# Patient Record
Sex: MALE | Race: BLACK OR AFRICAN AMERICAN | NOT HISPANIC OR LATINO | URBAN - METROPOLITAN AREA
[De-identification: names, ages, dates, MRNs, and addresses within clinical notes are randomized per-mention and may not be internally consistent; named-entity substitution may affect disease eponyms.]

---

## 2018-09-17 ENCOUNTER — EMERGENCY (EMERGENCY)
Facility: HOSPITAL | Age: 53
LOS: 1 days | Discharge: HOME | End: 2018-09-17
Attending: EMERGENCY MEDICINE
Payer: MEDICAID

## 2018-09-17 VITALS
HEART RATE: 55 BPM | WEIGHT: 210.1 LBS | SYSTOLIC BLOOD PRESSURE: 130 MMHG | OXYGEN SATURATION: 97 % | DIASTOLIC BLOOD PRESSURE: 78 MMHG | RESPIRATION RATE: 17 BRPM | TEMPERATURE: 98 F | HEIGHT: 69 IN

## 2018-09-17 LAB
APPEARANCE UR: CLEAR — SIGNIFICANT CHANGE UP
BACTERIA # UR AUTO: NEGATIVE — SIGNIFICANT CHANGE UP
BILIRUB UR-MCNC: NEGATIVE — SIGNIFICANT CHANGE UP
COLOR SPEC: SIGNIFICANT CHANGE UP
DIFF PNL FLD: NEGATIVE — SIGNIFICANT CHANGE UP
EPI CELLS # UR: 0 /HPF — SIGNIFICANT CHANGE UP
GLUCOSE UR QL: NEGATIVE — SIGNIFICANT CHANGE UP
HYALINE CASTS # UR AUTO: 0 /LPF — SIGNIFICANT CHANGE UP (ref 0–2)
KETONES UR-MCNC: NEGATIVE — SIGNIFICANT CHANGE UP
LEUKOCYTE ESTERASE UR-ACNC: NEGATIVE — SIGNIFICANT CHANGE UP
NITRITE UR-MCNC: NEGATIVE — SIGNIFICANT CHANGE UP
PH UR: 6.5 — SIGNIFICANT CHANGE UP (ref 5–8)
PROT UR-MCNC: NEGATIVE — SIGNIFICANT CHANGE UP
RBC CASTS # UR COMP ASSIST: 1 /HPF — SIGNIFICANT CHANGE UP (ref 0–4)
SP GR SPEC: 1.02 — SIGNIFICANT CHANGE UP
UROBILINOGEN FLD QL: NEGATIVE — SIGNIFICANT CHANGE UP
WBC UR QL: 2 /HPF — SIGNIFICANT CHANGE UP (ref 0–5)

## 2018-09-17 PROCEDURE — 99283 EMERGENCY DEPT VISIT LOW MDM: CPT

## 2018-09-17 PROCEDURE — 81001 URINALYSIS AUTO W/SCOPE: CPT

## 2018-09-17 RX ORDER — IBUPROFEN 200 MG
600 TABLET ORAL ONCE
Qty: 0 | Refills: 0 | Status: COMPLETED | OUTPATIENT
Start: 2018-09-17 | End: 2018-09-17

## 2018-09-17 RX ORDER — ACETAMINOPHEN 500 MG
975 TABLET ORAL ONCE
Qty: 0 | Refills: 0 | Status: COMPLETED | OUTPATIENT
Start: 2018-09-17 | End: 2018-09-17

## 2018-09-17 RX ADMIN — Medication 975 MILLIGRAM(S): at 17:33

## 2018-09-17 RX ADMIN — Medication 975 MILLIGRAM(S): at 18:31

## 2018-09-17 RX ADMIN — Medication 600 MILLIGRAM(S): at 17:34

## 2018-09-17 RX ADMIN — Medication 600 MILLIGRAM(S): at 18:31
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
## 2018-09-17 NOTE — ED PROVIDER NOTE - MEDICAL DECISION MAKING DETAILS
Jordin: Patient with left lower back pain x 1 week, sharp, worse with movement. will give pain control, check ua, reassess. no bowel/bladder incontinence, no fever, no ivda, non radiating, ambulating and FROM of low back.
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
## 2018-09-17 NOTE — ED ADULT NURSE NOTE - NSIMPLEMENTINTERV_GEN_ALL_ED
Implemented All Universal Safety Interventions:  [place] to call system. Call bell, personal items and telephone within reach. Instruct patient to call for assistance. Room bathroom lighting operational. Non-slip footwear when patient is off stretcher. Physically safe environment: no spills, clutter or unnecessary equipment. Stretcher in lowest position, wheels locked, appropriate side rails in place.

## 2020-05-14 ENCOUNTER — TRANSCRIPTION ENCOUNTER (OUTPATIENT)
Age: 55
End: 2020-05-14

## 2020-05-14 ENCOUNTER — INPATIENT (INPATIENT)
Facility: HOSPITAL | Age: 55
LOS: 1 days | Discharge: HOME | DRG: 131 | End: 2020-05-16
Attending: SURGERY | Admitting: SURGERY
Payer: COMMERCIAL

## 2020-05-14 VITALS
OXYGEN SATURATION: 96 % | WEIGHT: 210.1 LBS | RESPIRATION RATE: 18 BRPM | HEART RATE: 79 BPM | TEMPERATURE: 99 F | HEIGHT: 68 IN | SYSTOLIC BLOOD PRESSURE: 163 MMHG | DIASTOLIC BLOOD PRESSURE: 99 MMHG

## 2020-05-14 DIAGNOSIS — S02.609B FRACTURE OF MANDIBLE, UNSPECIFIED, INITIAL ENCOUNTER FOR OPEN FRACTURE: ICD-10-CM

## 2020-05-14 LAB
ALBUMIN SERPL ELPH-MCNC: 4.4 G/DL — SIGNIFICANT CHANGE UP (ref 3.3–5)
ALP SERPL-CCNC: 110 U/L — SIGNIFICANT CHANGE UP (ref 40–120)
ALT FLD-CCNC: 21 U/L — SIGNIFICANT CHANGE UP (ref 10–45)
ANION GAP SERPL CALC-SCNC: 17 MMOL/L — SIGNIFICANT CHANGE UP (ref 5–17)
AST SERPL-CCNC: 20 U/L — SIGNIFICANT CHANGE UP (ref 10–40)
BASOPHILS # BLD AUTO: 0.02 K/UL — SIGNIFICANT CHANGE UP (ref 0–0.2)
BASOPHILS NFR BLD AUTO: 0.2 % — SIGNIFICANT CHANGE UP (ref 0–2)
BILIRUB SERPL-MCNC: 1.9 MG/DL — HIGH (ref 0.2–1.2)
BLD GP AB SCN SERPL QL: NEGATIVE — SIGNIFICANT CHANGE UP
BUN SERPL-MCNC: 11 MG/DL — SIGNIFICANT CHANGE UP (ref 7–23)
CALCIUM SERPL-MCNC: 9.7 MG/DL — SIGNIFICANT CHANGE UP (ref 8.4–10.5)
CHLORIDE SERPL-SCNC: 95 MMOL/L — LOW (ref 96–108)
CO2 SERPL-SCNC: 22 MMOL/L — SIGNIFICANT CHANGE UP (ref 22–31)
CREAT SERPL-MCNC: 1.14 MG/DL — SIGNIFICANT CHANGE UP (ref 0.5–1.3)
EOSINOPHIL # BLD AUTO: 0.01 K/UL — SIGNIFICANT CHANGE UP (ref 0–0.5)
EOSINOPHIL NFR BLD AUTO: 0.1 % — SIGNIFICANT CHANGE UP (ref 0–6)
GLUCOSE SERPL-MCNC: 123 MG/DL — HIGH (ref 70–99)
HCT VFR BLD CALC: 51.8 % — HIGH (ref 39–50)
HGB BLD-MCNC: 17.5 G/DL — HIGH (ref 13–17)
IMM GRANULOCYTES NFR BLD AUTO: 0.3 % — SIGNIFICANT CHANGE UP (ref 0–1.5)
LYMPHOCYTES # BLD AUTO: 1.03 K/UL — SIGNIFICANT CHANGE UP (ref 1–3.3)
LYMPHOCYTES # BLD AUTO: 9.4 % — LOW (ref 13–44)
MCHC RBC-ENTMCNC: 30.5 PG — SIGNIFICANT CHANGE UP (ref 27–34)
MCHC RBC-ENTMCNC: 33.8 GM/DL — SIGNIFICANT CHANGE UP (ref 32–36)
MCV RBC AUTO: 90.2 FL — SIGNIFICANT CHANGE UP (ref 80–100)
MONOCYTES # BLD AUTO: 0.82 K/UL — SIGNIFICANT CHANGE UP (ref 0–0.9)
MONOCYTES NFR BLD AUTO: 7.5 % — SIGNIFICANT CHANGE UP (ref 2–14)
NEUTROPHILS # BLD AUTO: 9.07 K/UL — HIGH (ref 1.8–7.4)
NEUTROPHILS NFR BLD AUTO: 82.5 % — HIGH (ref 43–77)
NRBC # BLD: 0 /100 WBCS — SIGNIFICANT CHANGE UP (ref 0–0)
PLATELET # BLD AUTO: 171 K/UL — SIGNIFICANT CHANGE UP (ref 150–400)
POTASSIUM SERPL-MCNC: 3.8 MMOL/L — SIGNIFICANT CHANGE UP (ref 3.5–5.3)
POTASSIUM SERPL-SCNC: 3.8 MMOL/L — SIGNIFICANT CHANGE UP (ref 3.5–5.3)
PROT SERPL-MCNC: 8.4 G/DL — HIGH (ref 6–8.3)
RBC # BLD: 5.74 M/UL — SIGNIFICANT CHANGE UP (ref 4.2–5.8)
RBC # FLD: 12.6 % — SIGNIFICANT CHANGE UP (ref 10.3–14.5)
RH IG SCN BLD-IMP: POSITIVE — SIGNIFICANT CHANGE UP
RH IG SCN BLD-IMP: POSITIVE — SIGNIFICANT CHANGE UP
SARS-COV-2 RNA SPEC QL NAA+PROBE: SIGNIFICANT CHANGE UP
SODIUM SERPL-SCNC: 134 MMOL/L — LOW (ref 135–145)
WBC # BLD: 10.98 K/UL — HIGH (ref 3.8–10.5)
WBC # FLD AUTO: 10.98 K/UL — HIGH (ref 3.8–10.5)

## 2020-05-14 PROCEDURE — 76377 3D RENDER W/INTRP POSTPROCES: CPT | Mod: 26

## 2020-05-14 PROCEDURE — 99285 EMERGENCY DEPT VISIT HI MDM: CPT

## 2020-05-14 PROCEDURE — 70450 CT HEAD/BRAIN W/O DYE: CPT | Mod: 26

## 2020-05-14 PROCEDURE — 70486 CT MAXILLOFACIAL W/O DYE: CPT | Mod: 26

## 2020-05-14 RX ORDER — ACETAMINOPHEN 500 MG
975 TABLET ORAL ONCE
Refills: 0 | Status: COMPLETED | OUTPATIENT
Start: 2020-05-14 | End: 2020-05-14

## 2020-05-14 RX ORDER — OXYCODONE HYDROCHLORIDE 5 MG/1
5 TABLET ORAL ONCE
Refills: 0 | Status: DISCONTINUED | OUTPATIENT
Start: 2020-05-14 | End: 2020-05-14

## 2020-05-14 RX ORDER — ACETAMINOPHEN 500 MG
650 TABLET ORAL EVERY 6 HOURS
Refills: 0 | Status: DISCONTINUED | OUTPATIENT
Start: 2020-05-14 | End: 2020-05-15

## 2020-05-14 RX ORDER — SODIUM CHLORIDE 9 MG/ML
1000 INJECTION, SOLUTION INTRAVENOUS
Refills: 0 | Status: DISCONTINUED | OUTPATIENT
Start: 2020-05-14 | End: 2020-05-15

## 2020-05-14 RX ORDER — IBUPROFEN 200 MG
600 TABLET ORAL ONCE
Refills: 0 | Status: COMPLETED | OUTPATIENT
Start: 2020-05-14 | End: 2020-05-14

## 2020-05-14 RX ORDER — OXYCODONE HYDROCHLORIDE 5 MG/1
5 TABLET ORAL EVERY 4 HOURS
Refills: 0 | Status: DISCONTINUED | OUTPATIENT
Start: 2020-05-14 | End: 2020-05-15

## 2020-05-14 RX ORDER — TETANUS TOXOID, REDUCED DIPHTHERIA TOXOID AND ACELLULAR PERTUSSIS VACCINE, ADSORBED 5; 2.5; 8; 8; 2.5 [IU]/.5ML; [IU]/.5ML; UG/.5ML; UG/.5ML; UG/.5ML
0.5 SUSPENSION INTRAMUSCULAR ONCE
Refills: 0 | Status: COMPLETED | OUTPATIENT
Start: 2020-05-14 | End: 2020-05-14

## 2020-05-14 RX ORDER — AMPICILLIN SODIUM AND SULBACTAM SODIUM 250; 125 MG/ML; MG/ML
3 INJECTION, POWDER, FOR SUSPENSION INTRAMUSCULAR; INTRAVENOUS ONCE
Refills: 0 | Status: COMPLETED | OUTPATIENT
Start: 2020-05-14 | End: 2020-05-14

## 2020-05-14 RX ORDER — ENOXAPARIN SODIUM 100 MG/ML
40 INJECTION SUBCUTANEOUS DAILY
Refills: 0 | Status: DISCONTINUED | OUTPATIENT
Start: 2020-05-14 | End: 2020-05-15

## 2020-05-14 RX ADMIN — Medication 650 MILLIGRAM(S): at 18:41

## 2020-05-14 RX ADMIN — SODIUM CHLORIDE 100 MILLILITER(S): 9 INJECTION, SOLUTION INTRAVENOUS at 18:38

## 2020-05-14 RX ADMIN — ENOXAPARIN SODIUM 40 MILLIGRAM(S): 100 INJECTION SUBCUTANEOUS at 23:54

## 2020-05-14 RX ADMIN — OXYCODONE HYDROCHLORIDE 5 MILLIGRAM(S): 5 TABLET ORAL at 23:25

## 2020-05-14 RX ADMIN — AMPICILLIN SODIUM AND SULBACTAM SODIUM 3 GRAM(S): 250; 125 INJECTION, POWDER, FOR SUSPENSION INTRAMUSCULAR; INTRAVENOUS at 11:39

## 2020-05-14 RX ADMIN — OXYCODONE HYDROCHLORIDE 5 MILLIGRAM(S): 5 TABLET ORAL at 13:29

## 2020-05-14 RX ADMIN — Medication 975 MILLIGRAM(S): at 10:22

## 2020-05-14 RX ADMIN — OXYCODONE HYDROCHLORIDE 5 MILLIGRAM(S): 5 TABLET ORAL at 10:23

## 2020-05-14 RX ADMIN — OXYCODONE HYDROCHLORIDE 5 MILLIGRAM(S): 5 TABLET ORAL at 18:38

## 2020-05-14 RX ADMIN — AMPICILLIN SODIUM AND SULBACTAM SODIUM 200 GRAM(S): 250; 125 INJECTION, POWDER, FOR SUSPENSION INTRAMUSCULAR; INTRAVENOUS at 10:22

## 2020-05-14 RX ADMIN — Medication 600 MILLIGRAM(S): at 15:02
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
## 2020-05-14 NOTE — H&P ADULT - NSHPSOCIALHISTORY_GEN_ALL_CORE
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 Occasional drinker, denies tobacco or recreational drug use.
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
## 2020-05-14 NOTE — ED PROVIDER NOTE - PHYSICAL EXAMINATION
NEURO: pupils 3 mm, PERRL, EOMI (CN III, IV, VI), facial sensation intact to light touch in all 3 divisions bilat (CN V), face is symmetric with normal eye closure, eye opening, and smile (CN VII), hearing is normal to rubbing fingers (CN VII), palate elevates symmetrically, phonation is normal (CN IX, X),  shoulder shrug intact bilat (CN XI), tongue is midline with nl movements and no atrophy (CN XII), finger to nose test nl bilat, negative pronator drift bilat, negative Romberg, speech is clear; 5/5 motor strength BUE and BLE: deltoids, biceps, triceps, wrist flexors/extensors, hand , hip flexors, knee flexors/extensors, plantar/dorsiflexors, hallux flexors/extensors; sensation intact to light touch BUE and BLE: C5-T1 and L3-S1; gait wnl  no midline c/t/l spinal ttp or stepoffs  no raccoon eyes or christine sign  +ttp over L eye with surrounding ecchymosis and small non-bleeding superficial abrasion   no tmj ttp
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
## 2020-05-14 NOTE — ED PROVIDER NOTE - PROGRESS NOTE DETAILS
ATTG: : I reviewed the ct images and spoke with the radiologist. noted to have b/l jaw fractures and also depressed inf orbital wall fx. dental was consulted immediately as well as Plastics / facial surgery. awaiting full evaluation and recommendations.

## 2020-05-14 NOTE — ED PROVIDER NOTE - CARE PLAN
Principal Discharge DX:	Mandibular fracture, open  Secondary Diagnosis:	Orbital wall fracture, closed, initial encounter

## 2020-05-14 NOTE — ED PROVIDER NOTE - ENMT, MLM
LIMITED JAW CLOSURE INCONGRUENT TEETH CLOSURE; Airway patent, Nasal mucosa clear. Mouth with normal mucosa. Throat has no vesicles, no oropharyngeal exudates and uvula is midline.
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
## 2020-05-14 NOTE — H&P ADULT - NSHPLABSRESULTS_GEN_ALL_CORE
< end of copied text >    < from: CT Maxillofacial No Cont (05.14.20 @ 10:38) >    MAXILLOFACIAL BONES:    There is an acute depressed left orbital floor fracture with herniationof intraorbital fat. The left inferior rectus muscle lies in close apposition with the fracture site (series 601, image 20). Extraocular muscle entrapment must be excluded on a clinical basis. The bone fragment is depressed approximately 1 cm. The fracture site involves the left infraorbital canal and foramen which is also depressed. There is air noted within the periorbital soft tissues on the left. Air extends into the left retroantral fat-pad. There is an air-fluid level within the left maxillary sinus likely representing hemorrhage.    Left-sided cataract removal is noted. A left-sided scleral buckle is in place. No gross left-sided retrobulbar hemorrhage is seen. The right orbit inclusive of the globes, extraocular muscles, optic nerves,and orbital fat appear within normal limits.    There are acute bilateral mandibular fractures which are minimally displaced. The fracture on the right courses through the mandibular body and mandibular canal. The fracture also extends between the first and second premolars. No evidence of tooth fracture on the right. The left mandibular fracture involves posterior body. The fracture line extends towards the third molar tooth with further extension through the lingual and buccal cortex involving the tooth which is also likely fractured. The fracture also traverses through the mandibular canal.    There is an additional acute fracture involving the left lateral pterygoid plate which is displaced.    Additional age indeterminate bilateral nasalbone fractures are seen.    There is air noted within the left facial soft tissues extending into the submandibular and parapharyngeal spaces on the left.    The remainder of the facial bones are intact.     IMPRESSION:    Acute left orbital floor fracture with herniation of fat as well as 1 cm of bone fragment depression into the left maxillary sinus. The left inferior rectus muscle abuts the fracture site. Extraocular muscle entrapment must be excluded on a clinical basis.    Acute minimally displaced bilateral mandibular body fractures, as discussed. Associated fractured left third mandibular molar tooth.    Acute displaced left-sided lateral pterygoid plate fracture.    Additional age indeterminate bilateral nasal bone fractures.    No evidence of acute intracranial pathology. No intracranial hemorrhage, mass effect or midline shift.    < end of copied text >

## 2020-05-14 NOTE — H&P ADULT - NSHPPHYSICALEXAM_GEN_ALL_CORE
Gen: No acute distress  HEENT: EOMI w/o pain. Left patrick-orbital edema and ecchymosis. Minimally TTP, no step offs. Open bite, malocclusion, anterior segment mobile. Intraoral open fracture at third molar on left, difficult to assess dentition. No septal hematoma. Non TTP over nasal dorsum.   Resp: Unlabored  Neuro: Alert and oriented x3. EOMI (CN III, IV, VI), facial sensation intact to light touch in all 3 divisions bilat (CN V), face is symmetric with normal eye closure, eye opening, and smile (CN VII), hearing is normal to rubbing fingers (CN VII), palate elevates symmetrically, phonation is normal (CN IX, X),  shoulder shrug intact bilat (CN XI), tongue is midline with nl movements and no atrophy (CN XII). No focal deficit identified.
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
## 2020-05-15 ENCOUNTER — RESULT REVIEW (OUTPATIENT)
Age: 55
End: 2020-05-15

## 2020-05-15 ENCOUNTER — TRANSCRIPTION ENCOUNTER (OUTPATIENT)
Age: 55
End: 2020-05-15

## 2020-05-15 LAB
ANION GAP SERPL CALC-SCNC: 16 MMOL/L — SIGNIFICANT CHANGE UP (ref 5–17)
APTT BLD: 31.2 SEC — SIGNIFICANT CHANGE UP (ref 27.5–36.3)
BUN SERPL-MCNC: 10 MG/DL — SIGNIFICANT CHANGE UP (ref 7–23)
CALCIUM SERPL-MCNC: 9.2 MG/DL — SIGNIFICANT CHANGE UP (ref 8.4–10.5)
CHLORIDE SERPL-SCNC: 95 MMOL/L — LOW (ref 96–108)
CO2 SERPL-SCNC: 23 MMOL/L — SIGNIFICANT CHANGE UP (ref 22–31)
CREAT SERPL-MCNC: 1.05 MG/DL — SIGNIFICANT CHANGE UP (ref 0.5–1.3)
GLUCOSE SERPL-MCNC: 94 MG/DL — SIGNIFICANT CHANGE UP (ref 70–99)
HCT VFR BLD CALC: 49.9 % — SIGNIFICANT CHANGE UP (ref 39–50)
HGB BLD-MCNC: 16.4 G/DL — SIGNIFICANT CHANGE UP (ref 13–17)
INR BLD: 1.26 RATIO — HIGH (ref 0.88–1.16)
MCHC RBC-ENTMCNC: 29.8 PG — SIGNIFICANT CHANGE UP (ref 27–34)
MCHC RBC-ENTMCNC: 32.9 GM/DL — SIGNIFICANT CHANGE UP (ref 32–36)
MCV RBC AUTO: 90.6 FL — SIGNIFICANT CHANGE UP (ref 80–100)
NRBC # BLD: 0 /100 WBCS — SIGNIFICANT CHANGE UP (ref 0–0)
PLATELET # BLD AUTO: 152 K/UL — SIGNIFICANT CHANGE UP (ref 150–400)
POTASSIUM SERPL-MCNC: 3.4 MMOL/L — LOW (ref 3.5–5.3)
POTASSIUM SERPL-SCNC: 3.4 MMOL/L — LOW (ref 3.5–5.3)
PROTHROM AB SERPL-ACNC: 14.3 SEC — HIGH (ref 10–13.1)
RBC # BLD: 5.51 M/UL — SIGNIFICANT CHANGE UP (ref 4.2–5.8)
RBC # FLD: 12.5 % — SIGNIFICANT CHANGE UP (ref 10.3–14.5)
SODIUM SERPL-SCNC: 134 MMOL/L — LOW (ref 135–145)
WBC # BLD: 8.82 K/UL — SIGNIFICANT CHANGE UP (ref 3.8–10.5)
WBC # FLD AUTO: 8.82 K/UL — SIGNIFICANT CHANGE UP (ref 3.8–10.5)

## 2020-05-15 PROCEDURE — 76376 3D RENDER W/INTRP POSTPROCES: CPT | Mod: 26

## 2020-05-15 PROCEDURE — 88300 SURGICAL PATH GROSS: CPT | Mod: 26

## 2020-05-15 PROCEDURE — 70486 CT MAXILLOFACIAL W/O DYE: CPT | Mod: 26

## 2020-05-15 PROCEDURE — 76377 3D RENDER W/INTRP POSTPROCES: CPT | Mod: 26

## 2020-05-15 RX ORDER — CHLORHEXIDINE GLUCONATE 213 G/1000ML
15 SOLUTION TOPICAL
Refills: 0 | Status: DISCONTINUED | OUTPATIENT
Start: 2020-05-15 | End: 2020-05-16

## 2020-05-15 RX ORDER — SODIUM CHLORIDE 9 MG/ML
1000 INJECTION, SOLUTION INTRAVENOUS
Refills: 0 | Status: DISCONTINUED | OUTPATIENT
Start: 2020-05-15 | End: 2020-05-15

## 2020-05-15 RX ORDER — ENOXAPARIN SODIUM 100 MG/ML
40 INJECTION SUBCUTANEOUS DAILY
Refills: 0 | Status: DISCONTINUED | OUTPATIENT
Start: 2020-05-15 | End: 2020-05-16

## 2020-05-15 RX ORDER — OXYCODONE HYDROCHLORIDE 5 MG/1
5 TABLET ORAL EVERY 4 HOURS
Refills: 0 | Status: DISCONTINUED | OUTPATIENT
Start: 2020-05-15 | End: 2020-05-16

## 2020-05-15 RX ORDER — ACETAMINOPHEN 500 MG
975 TABLET ORAL EVERY 6 HOURS
Refills: 0 | Status: DISCONTINUED | OUTPATIENT
Start: 2020-05-15 | End: 2020-05-15

## 2020-05-15 RX ORDER — HYDRALAZINE HCL 50 MG
10 TABLET ORAL ONCE
Refills: 0 | Status: COMPLETED | OUTPATIENT
Start: 2020-05-15 | End: 2020-05-15

## 2020-05-15 RX ORDER — ACETAMINOPHEN 500 MG
975 TABLET ORAL EVERY 6 HOURS
Refills: 0 | Status: DISCONTINUED | OUTPATIENT
Start: 2020-05-15 | End: 2020-05-16

## 2020-05-15 RX ORDER — HYDROMORPHONE HYDROCHLORIDE 2 MG/ML
0.5 INJECTION INTRAMUSCULAR; INTRAVENOUS; SUBCUTANEOUS
Refills: 0 | Status: DISCONTINUED | OUTPATIENT
Start: 2020-05-15 | End: 2020-05-15

## 2020-05-15 RX ORDER — ONDANSETRON 8 MG/1
4 TABLET, FILM COATED ORAL EVERY 6 HOURS
Refills: 0 | Status: DISCONTINUED | OUTPATIENT
Start: 2020-05-15 | End: 2020-05-16

## 2020-05-15 RX ORDER — SENNA PLUS 8.6 MG/1
2 TABLET ORAL AT BEDTIME
Refills: 0 | Status: DISCONTINUED | OUTPATIENT
Start: 2020-05-15 | End: 2020-05-16

## 2020-05-15 RX ORDER — HYDROCHLOROTHIAZIDE 25 MG
12.5 TABLET ORAL AT BEDTIME
Refills: 0 | Status: DISCONTINUED | OUTPATIENT
Start: 2020-05-15 | End: 2020-05-16

## 2020-05-15 RX ORDER — DIPHENHYDRAMINE HCL 50 MG
25 CAPSULE ORAL EVERY 6 HOURS
Refills: 0 | Status: DISCONTINUED | OUTPATIENT
Start: 2020-05-15 | End: 2020-05-16

## 2020-05-15 RX ORDER — OXYCODONE HYDROCHLORIDE 5 MG/1
5 TABLET ORAL ONCE
Refills: 0 | Status: DISCONTINUED | OUTPATIENT
Start: 2020-05-15 | End: 2020-05-15

## 2020-05-15 RX ORDER — BENZOCAINE AND MENTHOL 5; 1 G/100ML; G/100ML
1 LIQUID ORAL
Refills: 0 | Status: DISCONTINUED | OUTPATIENT
Start: 2020-05-15 | End: 2020-05-16

## 2020-05-15 RX ADMIN — OXYCODONE HYDROCHLORIDE 5 MILLIGRAM(S): 5 TABLET ORAL at 21:05

## 2020-05-15 RX ADMIN — Medication 12.5 MILLIGRAM(S): at 21:05

## 2020-05-15 RX ADMIN — HYDROMORPHONE HYDROCHLORIDE 0.5 MILLIGRAM(S): 2 INJECTION INTRAMUSCULAR; INTRAVENOUS; SUBCUTANEOUS at 14:20

## 2020-05-15 RX ADMIN — HYDROMORPHONE HYDROCHLORIDE 0.5 MILLIGRAM(S): 2 INJECTION INTRAMUSCULAR; INTRAVENOUS; SUBCUTANEOUS at 15:20

## 2020-05-15 RX ADMIN — ENOXAPARIN SODIUM 40 MILLIGRAM(S): 100 INJECTION SUBCUTANEOUS at 16:39

## 2020-05-15 RX ADMIN — Medication 975 MILLIGRAM(S): at 16:39

## 2020-05-15 RX ADMIN — Medication 975 MILLIGRAM(S): at 22:25

## 2020-05-15 RX ADMIN — OXYCODONE HYDROCHLORIDE 5 MILLIGRAM(S): 5 TABLET ORAL at 05:05

## 2020-05-15 RX ADMIN — OXYCODONE HYDROCHLORIDE 5 MILLIGRAM(S): 5 TABLET ORAL at 16:39

## 2020-05-15 RX ADMIN — CHLORHEXIDINE GLUCONATE 15 MILLILITER(S): 213 SOLUTION TOPICAL at 18:25

## 2020-05-15 RX ADMIN — SODIUM CHLORIDE 75 MILLILITER(S): 9 INJECTION, SOLUTION INTRAVENOUS at 15:22

## 2020-05-15 RX ADMIN — OXYCODONE HYDROCHLORIDE 5 MILLIGRAM(S): 5 TABLET ORAL at 01:33

## 2020-05-15 RX ADMIN — Medication 10 MILLIGRAM(S): at 18:24

## 2020-05-15 RX ADMIN — Medication 975 MILLIGRAM(S): at 05:05

## 2020-05-15 RX ADMIN — SODIUM CHLORIDE 100 MILLILITER(S): 9 INJECTION, SOLUTION INTRAVENOUS at 05:07
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
## 2020-05-15 NOTE — BRIEF OPERATIVE NOTE - OPERATION/FINDINGS
ORIF of left orbital floor w/ transconjunctival approach and plated. Delvalle stitch left in place. Right mandibular body fracture ORIF through intraoral incision. Left mandibular angle fracture ORIF through intraoral and percutaneous trochar. Mandibular molar removed. MMF removed at end of case

## 2020-05-15 NOTE — DISCHARGE NOTE PROVIDER - CARE PROVIDER_API CALL
Reed Daley (MD)  Plastic Surgery; Surgery  [address], [address]  [place] [address]  Phone: [phone]  Fax: [phone]  Follow Up Time: 2 weeks
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
## 2020-05-15 NOTE — BRIEF OPERATIVE NOTE - NSICDXBRIEFPROCEDURE_GEN_ALL_CORE_FT
PROCEDURES:  ORIF, fracture, mandible, without interdental fixation 15-May-2020 14:01:59  Felicita Renner  Open tx, blowout fracture, orbital floor, periorbital approach, w/ implant 15-May-2020 14:01:06  Felicita Renner

## 2020-05-15 NOTE — PROGRESS NOTE ADULT - SUBJECTIVE AND OBJECTIVE BOX
GENERAL SURGERY DAILY PROGRESS NOTE:    Interval:  No acute events overnight endorsed.    Subjective:  Patient seen and examined this am. Counselled regarding surgery and diet    Vital Signs Last 24 Hrs  T(C): 37.4 (15 May 2020 07:43), Max: 37.4 (14 May 2020 20:03)  T(F): 99.3 (15 May 2020 07:20), Max: 99.4 (14 May 2020 20:03)  HR: 70 (15 May 2020 07:43) (62 - 91)  BP: 143/81 (15 May 2020 07:43) (143/81 - 167/91)  RR: 18 (15 May 2020 07:43) (16 - 18)  SpO2: 96% (15 May 2020 07:43) (96% - 99%)    Exam:  Gen: No acute distress  HEENT: EOMI w/o pain. Left patrick-orbital edema and ecchymosis. Minimally TTP, no step offs. Open bite, malocclusion, anterior segment mobile. Intraoral open fracture at third molar on left, difficult to assess dentition. No septal hematoma. Non TTP over nasal dorsum.   Resp: Unlabored  Neuro: Alert and oriented x3. EOMI (CN III, IV, VI), facial sensation intact to light touch in all 3 divisions bilat (CN V), face is symmetric with normal eye closure, eye opening, and smile (CN VII), hearing is normal to rubbing fingers (CN VII), palate elevates symmetrically, phonation is normal (CN IX, X),  shoulder shrug intact bilat (CN XI), tongue is midline with nl movements and no atrophy (CN XII). No focal deficit identified.    I&O's Detail    14 May 2020 07:01  -  15 May 2020 07:00  --------------------------------------------------------  IN:  Total IN: 0 mL    OUT:    Voided: 600 mL  Total OUT: 600 mL    Total NET: -600 mL          Daily Height in cm: 172.72 (15 May 2020 07:43)    Daily     MEDICATIONS  (STANDING):  enoxaparin Injectable 40 milliGRAM(s) SubCutaneous daily  lactated ringers. 1000 milliLiter(s) (100 mL/Hr) IV Continuous <Continuous>    MEDICATIONS  (PRN):  acetaminophen   Tablet .. 975 milliGRAM(s) Oral every 6 hours PRN Mild Pain (1 - 3)  oxyCODONE    IR 5 milliGRAM(s) Oral every 4 hours PRN Moderate Pain (4 - 6)      LABS:                        16.4   8.82  )-----------( 152      ( 15 May 2020 06:14 )             49.9     05-15    134<L>  |  95<L>  |  10  ----------------------------<  94  3.4<L>   |  23  |  1.05    Ca    9.2      15 May 2020 06:13    TPro  8.4<H>  /  Alb  4.4  /  TBili  1.9<H>  /  DBili  x   /  AST  20  /  ALT  21  /  AlkPhos  110  05-14          IRVING Guerrero, PGY-1  Plastic Surgery  [phone]

## 2020-05-15 NOTE — DISCHARGE NOTE PROVIDER - NSDCMRMEDTOKEN_GEN_ALL_CORE_FT
chlorhexidine 0.12% mucous membrane liquid: 15 milliliter(s) mucous membrane once a day (at bedtime)   oxyCODONE 5 mg oral tablet: 1 tab(s) orally every 4 hours, As needed, Moderate Pain (4 - 6) MDD:6

## 2020-05-15 NOTE — DISCHARGE NOTE PROVIDER - NSDCCPCAREPLAN_GEN_ALL_CORE_FT
PRINCIPAL DISCHARGE DIAGNOSIS  Diagnosis: Mandibular fracture, open  Assessment and Plan of Treatment:       SECONDARY DISCHARGE DIAGNOSES  Diagnosis: Orbital wall fracture, closed, initial encounter  Assessment and Plan of Treatment:

## 2020-05-16 ENCOUNTER — TRANSCRIPTION ENCOUNTER (OUTPATIENT)
Age: 55
End: 2020-05-16

## 2020-05-16 VITALS
OXYGEN SATURATION: 98 % | HEART RATE: 80 BPM | TEMPERATURE: 98 F | DIASTOLIC BLOOD PRESSURE: 64 MMHG | RESPIRATION RATE: 18 BRPM | SYSTOLIC BLOOD PRESSURE: 158 MMHG

## 2020-05-16 PROCEDURE — 99285 EMERGENCY DEPT VISIT HI MDM: CPT | Mod: 25

## 2020-05-16 PROCEDURE — 88300 SURGICAL PATH GROSS: CPT

## 2020-05-16 PROCEDURE — 80053 COMPREHEN METABOLIC PANEL: CPT

## 2020-05-16 PROCEDURE — C1713: CPT

## 2020-05-16 PROCEDURE — 90715 TDAP VACCINE 7 YRS/> IM: CPT

## 2020-05-16 PROCEDURE — 70486 CT MAXILLOFACIAL W/O DYE: CPT

## 2020-05-16 PROCEDURE — 85610 PROTHROMBIN TIME: CPT

## 2020-05-16 PROCEDURE — 76377 3D RENDER W/INTRP POSTPROCES: CPT

## 2020-05-16 PROCEDURE — 85027 COMPLETE CBC AUTOMATED: CPT

## 2020-05-16 PROCEDURE — 96365 THER/PROPH/DIAG IV INF INIT: CPT

## 2020-05-16 PROCEDURE — 70450 CT HEAD/BRAIN W/O DYE: CPT

## 2020-05-16 PROCEDURE — 86900 BLOOD TYPING SEROLOGIC ABO: CPT

## 2020-05-16 PROCEDURE — 80048 BASIC METABOLIC PNL TOTAL CA: CPT

## 2020-05-16 PROCEDURE — 86850 RBC ANTIBODY SCREEN: CPT

## 2020-05-16 PROCEDURE — 85730 THROMBOPLASTIN TIME PARTIAL: CPT

## 2020-05-16 PROCEDURE — 86901 BLOOD TYPING SEROLOGIC RH(D): CPT

## 2020-05-16 RX ORDER — CHLORHEXIDINE GLUCONATE 213 G/1000ML
15 SOLUTION TOPICAL
Qty: 210 | Refills: 0
Start: 2020-05-16 | End: 2020-05-29

## 2020-05-16 RX ORDER — OXYCODONE HYDROCHLORIDE 5 MG/1
1 TABLET ORAL
Qty: 15 | Refills: 0
Start: 2020-05-16

## 2020-05-16 RX ADMIN — Medication 975 MILLIGRAM(S): at 12:42

## 2020-05-16 RX ADMIN — OXYCODONE HYDROCHLORIDE 5 MILLIGRAM(S): 5 TABLET ORAL at 10:04

## 2020-05-16 RX ADMIN — OXYCODONE HYDROCHLORIDE 5 MILLIGRAM(S): 5 TABLET ORAL at 05:11

## 2020-05-16 RX ADMIN — CHLORHEXIDINE GLUCONATE 15 MILLILITER(S): 213 SOLUTION TOPICAL at 05:11

## 2020-05-16 RX ADMIN — Medication 975 MILLIGRAM(S): at 05:11

## 2020-05-16 RX ADMIN — ENOXAPARIN SODIUM 40 MILLIGRAM(S): 100 INJECTION SUBCUTANEOUS at 11:58

## 2020-05-16 RX ADMIN — OXYCODONE HYDROCHLORIDE 5 MILLIGRAM(S): 5 TABLET ORAL at 01:06

## 2020-05-16 NOTE — PROGRESS NOTE ADULT - SUBJECTIVE AND OBJECTIVE BOX
Plastic Surgery Progress Note (pg [facility]: [id number], NS: [phone])    SUBJECTIVE:  Patient seen and examined at bedside this AM. No acute events overnight. AF/VSS. Pain well controlled. Tolerating liquids. Delvalle stitch removed     OBJECTIVE:     ** VITAL SIGNS / I&O's **    Vital Signs Last 24 Hrs  T(C): 36.7 (16 May 2020 09:26), Max: 37.6 (15 May 2020 17:24)  T(F): 98 (16 May 2020 09:26), Max: 99.7 (15 May 2020 17:24)  HR: 80 (16 May 2020 09:26) (71 - 108)  BP: 158/64 (16 May 2020 09:26) (139/63 - 204/94)  BP(mean): 119 (15 May 2020 15:30) (115 - 138)  RR: 18 (16 May 2020 09:26) (18 - 18)  SpO2: 98% (16 May 2020 09:26) (93% - 100%)      15 May 2020 07:01  -  16 May 2020 07:00  --------------------------------------------------------  IN:    lactated ringers.: 300 mL  Total IN: 300 mL    OUT:    Estimated Blood Loss: 30 mL    Voided: 1400 mL  Total OUT: 1430 mL    Total NET: -1130 mL          ** PHYSICAL EXAM **    -- CONSTITUTIONAL: NAD.   -- HEENT: Delvalle stitch removed. Perioribital edema stable.       **MEDS**  acetaminophen   Tablet .. 975 milliGRAM(s) Oral every 6 hours PRN  benzocaine 15 mG/menthol 3.6 mG (Sugar-Free) Lozenge 1 Lozenge Oral every 2 hours PRN  chlorhexidine 0.12% Liquid 15 milliLiter(s) Swish and Spit two times a day  diphenhydrAMINE   Injectable 25 milliGRAM(s) IV Push every 6 hours PRN  enoxaparin Injectable 40 milliGRAM(s) SubCutaneous daily  hydrochlorothiazide 12.5 milliGRAM(s) Oral at bedtime  ondansetron Injectable 4 milliGRAM(s) IV Push every 6 hours PRN  oxyCODONE    IR 5 milliGRAM(s) Oral every 4 hours PRN  senna 2 Tablet(s) Oral at bedtime      ** LABS **                          16.4   8.82  )-----------( 152      ( 15 May 2020 06:14 )             49.9     15 May 2020 06:13    134    |  95     |  10     ----------------------------<  94     3.4     |  23     |  1.05     Ca    9.2        15 May 2020 06:13    TPro  8.4    /  Alb  4.4    /  TBili  1.9    /  DBili  x      /  AST  20     /  ALT  21     /  AlkPhos  110    14 May 2020 10:18    PT/INR - ( 15 May 2020 08:13 )   PT: 14.3 sec;   INR: 1.26 ratio         PTT - ( 15 May 2020 08:13 )  PTT:31.2 sec  CAPILLARY BLOOD GLUCOSE
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
## 2020-05-16 NOTE — DISCHARGE NOTE NURSING/CASE MANAGEMENT/SOCIAL WORK - PATIENT PORTAL LINK FT
You can access the FollowMyHealth Patient Portal offered by [facility] by registering at the following website: [email or web address]. By joining [facility]’s FollowMyHealth portal, you will also be able to view your health information using other applications (apps) compatible with our system.

## 2020-05-27 PROBLEM — Z78.9 OTHER SPECIFIED HEALTH STATUS: Chronic | Status: ACTIVE | Noted: 2020-05-14

## 2020-06-02 PROBLEM — Z00.00 ENCOUNTER FOR PREVENTIVE HEALTH EXAMINATION: Status: ACTIVE | Noted: 2020-06-02

## 2020-06-03 ENCOUNTER — APPOINTMENT (OUTPATIENT)
Dept: PLASTIC SURGERY | Facility: CLINIC | Age: 55
End: 2020-06-03
Payer: MEDICAID

## 2020-06-03 VITALS — HEIGHT: 69 IN | BODY MASS INDEX: 28.14 KG/M2 | WEIGHT: 190 LBS

## 2020-06-03 PROCEDURE — 99024 POSTOP FOLLOW-UP VISIT: CPT

## 2020-06-03 NOTE — PHYSICAL EXAM
[de-identified] : alert, calm, cooperative\par  [de-identified] : EOMI, PERRL.  Moderate periorbital edema and ecchymosis of the left eye.  No restriction appreciated.   [de-identified] : gingival buccal sulcus incision is well-approximated.  There is moderate lower facial edema  NO signs of wound dehiscence or fluctuance. [de-identified] : respirations are even and unlabored\par

## 2020-06-03 NOTE — HISTORY OF PRESENT ILLNESS
[FreeTextEntry1] : Matt is a 55 year old male who presents for a postop evaluation.  He is s/p ORIF of Bilateral mandibular comminuted fracture with left angle fracture, right body fracture; Extraction of left mandibular third molar; repair of left orbital floor and medial wall blowout fracture with implant and bone graft to correct enophthalmos on 5/15/20.  He has been healing well and denies any specific complaints but reports swelling is still prominent along his mandible.  He denies diplopia or other visual disturbances.

## 2020-06-03 NOTE — REASON FOR VISIT
[Post Op: _________] : a [unfilled] post op visit [FreeTextEntry1] : DOS: 05/15/2020 s/p  ORIF of Bilateral mandibular comminuted fracture with left angle fracture, right body fracture; Extraction of left mandibular third molar; repair of left orbital floor and medial wall blowout fracture with implant and bone graft to correct enophthalmos.

## 2020-06-30 ENCOUNTER — LABORATORY RESULT (OUTPATIENT)
Age: 55
End: 2020-06-30

## 2020-07-01 ENCOUNTER — APPOINTMENT (OUTPATIENT)
Dept: PLASTIC SURGERY | Facility: CLINIC | Age: 55
End: 2020-07-01
Payer: MEDICAID

## 2020-07-01 PROCEDURE — 99024 POSTOP FOLLOW-UP VISIT: CPT

## 2020-07-01 RX ORDER — IBUPROFEN 600 MG/1
600 TABLET, FILM COATED ORAL EVERY 6 HOURS
Qty: 28 | Refills: 1 | Status: ACTIVE | COMMUNITY
Start: 2020-07-01 | End: 1900-01-01

## 2020-07-01 RX ORDER — OXYCODONE 5 MG/1
5 TABLET ORAL
Qty: 18 | Refills: 0 | Status: ACTIVE | COMMUNITY
Start: 2020-07-01 | End: 1900-01-01

## 2020-07-01 NOTE — HISTORY OF PRESENT ILLNESS
[FreeTextEntry1] : aMtt is a 55 year old male who presents for a postop evaluation. He is s/p ORIF of bilateral mandibular comminuted fracture with left angle fracture, right body fracture; extraction of left mandibular third molar; repair of left orbital floor and medial wall blowout fracture with implant and bone graft to correct enophthalmos on 5/15/20. He reports increased swelling along  the left submandibular region. He reports pain at the site but denies fever, or drainage. Patient also denies diplopia or other visual disturbances. \par

## 2020-07-01 NOTE — PHYSICAL EXAM
[de-identified] : EOMI, PERRL.  Mild left periorbital edema.  No restriction appreciated.   [de-identified] : alert, calm, cooperative\par  [de-identified] : respirations are even and unlabored\par  [de-identified] : gingival buccal sulcus incision is well-approximated.  There is significant left and anterior submandibular and discomfort upon palpation. Fluctuance appreciated.  Site prepped with Betadine and 4 ml of purulent discharge aspirated by Dr. Daley.  Cultures sent.

## 2020-07-08 ENCOUNTER — APPOINTMENT (OUTPATIENT)
Dept: PLASTIC SURGERY | Facility: CLINIC | Age: 55
End: 2020-07-08
Payer: MEDICAID

## 2020-07-08 PROCEDURE — 99024 POSTOP FOLLOW-UP VISIT: CPT

## 2020-07-08 RX ORDER — AMOXICILLIN AND CLAVULANATE POTASSIUM 875; 125 MG/1; MG/1
875-125 TABLET, COATED ORAL
Qty: 14 | Refills: 1 | Status: ACTIVE | COMMUNITY
Start: 2020-07-01 | End: 1900-01-01

## 2020-07-08 NOTE — HISTORY OF PRESENT ILLNESS
[FreeTextEntry1] : Matt is a 55 year old male who presents for a postop evaluation. He is s/p ORIF of bilateral mandibular comminuted fracture with left angle fracture, right body fracture; extraction of left mandibular third molar; repair of left orbital floor and medial wall blowout fracture with implant and bone graft to correct enophthalmos on 5/15/20. He reports swelling has decreased but is still present along the left submandibular region. He reports discomfort controlled by ibuprofen and denies fever, chills, malaise, or drainage. Patient also denies diplopia or other visual disturbances.

## 2020-07-08 NOTE — PHYSICAL EXAM
[de-identified] : alert, calm, cooperative\par  [de-identified] : gingival buccal sulcus incision is well-approximated.  There is moderate left and anterior submandibular and discomfort upon palpation. No fluctuance appreciated.  Abscess has consolidated.  Site prepped with alcohol and 2 ml of serous fluid was aspirated by Dr. Daley.   [de-identified] : EOMI, PERRL.  Mild left periorbital edema, which has improved.  No restriction appreciated.   [de-identified] : respirations are even and unlabored\par

## 2020-07-08 NOTE — REASON FOR VISIT
[Post Op: _________] : a [unfilled] post op visit [FreeTextEntry1] :  DOS: 05/15/2020 s/p ORIF of Bilateral mandibular comminuted fracture with left angle fracture, right body fracture; Extraction of left mandibular third molar; repair of left orbital floor and medial wall blowout fracture with implant and bone graft to correct enophthalmos. \par

## 2020-07-22 ENCOUNTER — APPOINTMENT (OUTPATIENT)
Dept: PLASTIC SURGERY | Facility: CLINIC | Age: 55
End: 2020-07-22
Payer: MEDICAID

## 2020-07-22 PROCEDURE — 99024 POSTOP FOLLOW-UP VISIT: CPT

## 2020-07-26 ENCOUNTER — INPATIENT (INPATIENT)
Facility: HOSPITAL | Age: 55
LOS: 1 days | Discharge: HOME | DRG: 496 | End: 2020-07-28
Attending: SURGERY | Admitting: SURGERY
Payer: COMMERCIAL

## 2020-07-26 ENCOUNTER — TRANSCRIPTION ENCOUNTER (OUTPATIENT)
Age: 55
End: 2020-07-26

## 2020-07-26 VITALS
TEMPERATURE: 98 F | OXYGEN SATURATION: 99 % | RESPIRATION RATE: 18 BRPM | HEIGHT: 68 IN | DIASTOLIC BLOOD PRESSURE: 69 MMHG | HEART RATE: 63 BPM | SYSTOLIC BLOOD PRESSURE: 138 MMHG | WEIGHT: 192.02 LBS

## 2020-07-26 DIAGNOSIS — Z47.2 ENCOUNTER FOR REMOVAL OF INTERNAL FIXATION DEVICE: ICD-10-CM

## 2020-07-26 LAB
ALBUMIN SERPL ELPH-MCNC: 3.9 G/DL — SIGNIFICANT CHANGE UP (ref 3.3–5)
ALP SERPL-CCNC: 97 U/L — SIGNIFICANT CHANGE UP (ref 40–120)
ALT FLD-CCNC: 13 U/L — SIGNIFICANT CHANGE UP (ref 10–45)
ANION GAP SERPL CALC-SCNC: 13 MMOL/L — SIGNIFICANT CHANGE UP (ref 5–17)
AST SERPL-CCNC: 22 U/L — SIGNIFICANT CHANGE UP (ref 10–40)
BASOPHILS # BLD AUTO: 0.03 K/UL — SIGNIFICANT CHANGE UP (ref 0–0.2)
BASOPHILS NFR BLD AUTO: 0.6 % — SIGNIFICANT CHANGE UP (ref 0–2)
BILIRUB SERPL-MCNC: 0.7 MG/DL — SIGNIFICANT CHANGE UP (ref 0.2–1.2)
BUN SERPL-MCNC: 10 MG/DL — SIGNIFICANT CHANGE UP (ref 7–23)
CALCIUM SERPL-MCNC: 9.3 MG/DL — SIGNIFICANT CHANGE UP (ref 8.4–10.5)
CHLORIDE SERPL-SCNC: 104 MMOL/L — SIGNIFICANT CHANGE UP (ref 96–108)
CO2 SERPL-SCNC: 25 MMOL/L — SIGNIFICANT CHANGE UP (ref 22–31)
CREAT SERPL-MCNC: 1.09 MG/DL — SIGNIFICANT CHANGE UP (ref 0.5–1.3)
EOSINOPHIL # BLD AUTO: 0.11 K/UL — SIGNIFICANT CHANGE UP (ref 0–0.5)
EOSINOPHIL NFR BLD AUTO: 2.2 % — SIGNIFICANT CHANGE UP (ref 0–6)
GLUCOSE SERPL-MCNC: 95 MG/DL — SIGNIFICANT CHANGE UP (ref 70–99)
HCT VFR BLD CALC: 42.7 % — SIGNIFICANT CHANGE UP (ref 39–50)
HGB BLD-MCNC: 14 G/DL — SIGNIFICANT CHANGE UP (ref 13–17)
IMM GRANULOCYTES NFR BLD AUTO: 0.2 % — SIGNIFICANT CHANGE UP (ref 0–1.5)
LYMPHOCYTES # BLD AUTO: 1.56 K/UL — SIGNIFICANT CHANGE UP (ref 1–3.3)
LYMPHOCYTES # BLD AUTO: 31.2 % — SIGNIFICANT CHANGE UP (ref 13–44)
MCHC RBC-ENTMCNC: 30.3 PG — SIGNIFICANT CHANGE UP (ref 27–34)
MCHC RBC-ENTMCNC: 32.8 GM/DL — SIGNIFICANT CHANGE UP (ref 32–36)
MCV RBC AUTO: 92.4 FL — SIGNIFICANT CHANGE UP (ref 80–100)
MONOCYTES # BLD AUTO: 0.47 K/UL — SIGNIFICANT CHANGE UP (ref 0–0.9)
MONOCYTES NFR BLD AUTO: 9.4 % — SIGNIFICANT CHANGE UP (ref 2–14)
NEUTROPHILS # BLD AUTO: 2.82 K/UL — SIGNIFICANT CHANGE UP (ref 1.8–7.4)
NEUTROPHILS NFR BLD AUTO: 56.4 % — SIGNIFICANT CHANGE UP (ref 43–77)
NRBC # BLD: 0 /100 WBCS — SIGNIFICANT CHANGE UP (ref 0–0)
PLATELET # BLD AUTO: 168 K/UL — SIGNIFICANT CHANGE UP (ref 150–400)
POTASSIUM SERPL-MCNC: 4.4 MMOL/L — SIGNIFICANT CHANGE UP (ref 3.5–5.3)
POTASSIUM SERPL-SCNC: 4.4 MMOL/L — SIGNIFICANT CHANGE UP (ref 3.5–5.3)
PROT SERPL-MCNC: 6.8 G/DL — SIGNIFICANT CHANGE UP (ref 6–8.3)
RBC # BLD: 4.62 M/UL — SIGNIFICANT CHANGE UP (ref 4.2–5.8)
RBC # FLD: 13 % — SIGNIFICANT CHANGE UP (ref 10.3–14.5)
SARS-COV-2 RNA SPEC QL NAA+PROBE: SIGNIFICANT CHANGE UP
SODIUM SERPL-SCNC: 142 MMOL/L — SIGNIFICANT CHANGE UP (ref 135–145)
WBC # BLD: 5 K/UL — SIGNIFICANT CHANGE UP (ref 3.8–10.5)
WBC # FLD AUTO: 5 K/UL — SIGNIFICANT CHANGE UP (ref 3.8–10.5)

## 2020-07-26 PROCEDURE — 70487 CT MAXILLOFACIAL W/DYE: CPT | Mod: 26

## 2020-07-26 PROCEDURE — 99285 EMERGENCY DEPT VISIT HI MDM: CPT

## 2020-07-26 RX ORDER — ACETAMINOPHEN 500 MG
650 TABLET ORAL EVERY 6 HOURS
Refills: 0 | Status: DISCONTINUED | OUTPATIENT
Start: 2020-07-26 | End: 2020-07-27

## 2020-07-26 RX ORDER — AMPICILLIN SODIUM AND SULBACTAM SODIUM 250; 125 MG/ML; MG/ML
INJECTION, POWDER, FOR SUSPENSION INTRAMUSCULAR; INTRAVENOUS
Refills: 0 | Status: DISCONTINUED | OUTPATIENT
Start: 2020-07-26 | End: 2020-07-27

## 2020-07-26 RX ORDER — ACETAMINOPHEN 500 MG
650 TABLET ORAL ONCE
Refills: 0 | Status: COMPLETED | OUTPATIENT
Start: 2020-07-26 | End: 2020-07-26

## 2020-07-26 RX ORDER — LANOLIN ALCOHOL/MO/W.PET/CERES
3 CREAM (GRAM) TOPICAL AT BEDTIME
Refills: 0 | Status: DISCONTINUED | OUTPATIENT
Start: 2020-07-26 | End: 2020-07-27

## 2020-07-26 RX ORDER — SODIUM CHLORIDE 9 MG/ML
1000 INJECTION, SOLUTION INTRAVENOUS
Refills: 0 | Status: DISCONTINUED | OUTPATIENT
Start: 2020-07-26 | End: 2020-07-27

## 2020-07-26 RX ORDER — MORPHINE SULFATE 50 MG/1
6 CAPSULE, EXTENDED RELEASE ORAL ONCE
Refills: 0 | Status: DISCONTINUED | OUTPATIENT
Start: 2020-07-26 | End: 2020-07-26

## 2020-07-26 RX ORDER — AMPICILLIN SODIUM AND SULBACTAM SODIUM 250; 125 MG/ML; MG/ML
3 INJECTION, POWDER, FOR SUSPENSION INTRAMUSCULAR; INTRAVENOUS EVERY 6 HOURS
Refills: 0 | Status: DISCONTINUED | OUTPATIENT
Start: 2020-07-27 | End: 2020-07-27

## 2020-07-26 RX ORDER — SENNA PLUS 8.6 MG/1
2 TABLET ORAL AT BEDTIME
Refills: 0 | Status: DISCONTINUED | OUTPATIENT
Start: 2020-07-26 | End: 2020-07-27

## 2020-07-26 RX ORDER — OXYCODONE HYDROCHLORIDE 5 MG/1
5 TABLET ORAL EVERY 4 HOURS
Refills: 0 | Status: DISCONTINUED | OUTPATIENT
Start: 2020-07-26 | End: 2020-07-27

## 2020-07-26 RX ORDER — ONDANSETRON 8 MG/1
4 TABLET, FILM COATED ORAL EVERY 6 HOURS
Refills: 0 | Status: DISCONTINUED | OUTPATIENT
Start: 2020-07-26 | End: 2020-07-27

## 2020-07-26 RX ORDER — AMPICILLIN SODIUM AND SULBACTAM SODIUM 250; 125 MG/ML; MG/ML
3 INJECTION, POWDER, FOR SUSPENSION INTRAMUSCULAR; INTRAVENOUS ONCE
Refills: 0 | Status: COMPLETED | OUTPATIENT
Start: 2020-07-26 | End: 2020-07-26

## 2020-07-26 RX ORDER — CALCIUM CARBONATE 500(1250)
1250 TABLET ORAL EVERY 4 HOURS
Refills: 0 | Status: DISCONTINUED | OUTPATIENT
Start: 2020-07-26 | End: 2020-07-27

## 2020-07-26 RX ORDER — DIPHENHYDRAMINE HCL 50 MG
25 CAPSULE ORAL EVERY 4 HOURS
Refills: 0 | Status: DISCONTINUED | OUTPATIENT
Start: 2020-07-26 | End: 2020-07-27

## 2020-07-26 RX ORDER — ENOXAPARIN SODIUM 100 MG/ML
40 INJECTION SUBCUTANEOUS DAILY
Refills: 0 | Status: DISCONTINUED | OUTPATIENT
Start: 2020-07-26 | End: 2020-07-27

## 2020-07-26 RX ORDER — OXYCODONE HYDROCHLORIDE 5 MG/1
10 TABLET ORAL EVERY 4 HOURS
Refills: 0 | Status: DISCONTINUED | OUTPATIENT
Start: 2020-07-26 | End: 2020-07-27

## 2020-07-26 RX ORDER — CHLORHEXIDINE GLUCONATE 213 G/1000ML
15 SOLUTION TOPICAL
Refills: 0 | Status: DISCONTINUED | OUTPATIENT
Start: 2020-07-26 | End: 2020-07-27

## 2020-07-26 RX ADMIN — AMPICILLIN SODIUM AND SULBACTAM SODIUM 200 GRAM(S): 250; 125 INJECTION, POWDER, FOR SUSPENSION INTRAMUSCULAR; INTRAVENOUS at 19:36

## 2020-07-26 RX ADMIN — Medication 650 MILLIGRAM(S): at 15:55

## 2020-07-26 RX ADMIN — Medication 650 MILLIGRAM(S): at 15:23

## 2020-07-26 NOTE — ED ADULT NURSE NOTE - PAIN RATING/NUMBER SCALE (0-10): ACTIVITY
----- Message from Laine Fang sent at 2/8/2018  7:34 AM CST -----  Contact: patient [phone]  You prescribed remeron 15mg for sleep but it is not helping and pt states that she has sent several requests for a different rx. Pt said that she hasn't slept for 4 days. Please call pt.      Baldemar Sawyer [phone]   5

## 2020-07-26 NOTE — ED PROVIDER NOTE - ENMT, MLM
Airway patent, Nasal mucosa clear. Mouth with normal mucosa. jaw opens/closes w nl feeling. at angle of L mandible there is swelling and edema, no warmth, not much pain to palpation. tolerating secretions.

## 2020-07-26 NOTE — ED ADULT NURSE NOTE - OBJECTIVE STATEMENT
complaining of left sided jaw pain. Pt states, "I broke my jaw in May 2020, my surgeon put plates in my jaw. I was healing fine then two weeks ago I started having left sided jaw pain and the doctor gave me antibiotics. I finished the meds but then a week ago the pain got really bad and the pain wont go away." Pt endorses left jaw pain 5/10 at present. Pt denies headache, blurred vision, lightheadedness, dizziness, SOB, chest pain, abdominal pain, N/V/D, urinary symptoms, numbness and tingling at present. 54 y/o M A&Ox3, denies PMH/PSH presents to the ED from home c/o of left sided jaw pain. Pt states, "I broke my jaw in May 2020, my surgeon put plates in my jaw. I was healing fine then two weeks ago I started having left sided jaw pain and the doctor gave me antibiotics. I finished the meds but then a week ago the pain got really bad and the pain wont go away." Pt. reports decreased PO intake due to pain. Pt endorses left jaw pain 5/10 at present. Pt denies headache, blurred vision, lightheadedness, dizziness, SOB, chest pain, abdominal pain, N/V/D, urinary symptoms, numbness and tingling at present. IV access obtained. Call bell within reach, comfort and safety provided.

## 2020-07-26 NOTE — ED PROVIDER NOTE - OBJECTIVE STATEMENT
56 y/o male with PMHX significant for a jaw fx on May 14th presents to the ED c/o of left sided jaw pain that has been going on for 1 week. On July 1st the pt went back to Dr. Daley's office due to pain and swelling, pt was found to have an infection which was then drained and pt placed on Augmentin. Pt rates the pain as 10/10 and describes it as constant. The pain has been interfering with his ability to eat. Pt admits to swelling and tenderness around the rami of his left jaw. Denies any recent fevers, chills, nausea, vomiting, chest pain, SOB, abdominal pain, neck stiffness.

## 2020-07-26 NOTE — ED PROVIDER NOTE - NS_BEDUNITTYPES_ED_ALL_ED
Pt resting on stretcher, no respiratory distress noted, pt updated on plan of care, will continue to monitor, call light in reach.        Cleavon Leyden, RN  07/02/19 2009 SURGERY

## 2020-07-26 NOTE — ED PROVIDER NOTE - CLINICAL SUMMARY MEDICAL DECISION MAKING FREE TEXT BOX
Justyna: Pt had jaw fx on May 16 with repair. Had subsequent infection and drainage on 7/1/20 by Dr. Daley. Pt today has swelling and 10/10 pain in left jaw. Concerned for osteomyelitis or cellulitis. Ordered CT - possible osteomyelitis vs fx remodeling. Consulted Plastics facial. MARIELENA'James: Pt had jaw fx on May 16 with repair. Had subsequent infection and drainage on 7/1/20 by Dr. Daley. Pt today has swelling and 10/10 pain in left jaw. Concerned for osteomyelitis or cellulitis. Ordered CT - possible osteomyelitis vs fx remodeling. Consulted Plastics facial. Will admit under Dr. Reed Daley (plastics).

## 2020-07-26 NOTE — ED PROVIDER NOTE - PROGRESS NOTE DETAILS
Justyna: consulted facial plastics surgery. They will evaluate the patient. MARIELENA'James: Reassessed patient. States pain is 8/10. Pt refuses stronger pain meds.

## 2020-07-26 NOTE — ED PROVIDER NOTE - CARE PLAN
Principal Discharge DX:	Removal of pin, plate, geneva, or screw  Secondary Diagnosis:	Surgical site reaction, initial encounter

## 2020-07-26 NOTE — ED ADULT NURSE REASSESSMENT NOTE - NS ED NURSE REASSESS COMMENT FT1
Pt awake and alert, laying in stretcher. Pt does not appear to be in any acute distress at this time. DO O'James at bedside.

## 2020-07-26 NOTE — ED ADULT NURSE REASSESSMENT NOTE - NS ED NURSE REASSESS COMMENT FT1
Pt awake and alert, laying in stretcher, speaking in full coherent sentences. Pt states his pain is more tolerable at this time. DO O'James at bedside.

## 2020-07-26 NOTE — ED ADULT NURSE REASSESSMENT NOTE - NS ED NURSE REASSESS COMMENT FT1
Report received from Alison LAIRD. Pt arrived to the ED for jaw pain, pt admitted to surgery. Upon assessment, pt is AO x 4, speaking in full and complete sentences. Pt denies needs at this time. Wheels locked, bed in lowest position, appropriate side rails raised, call bell within reach. Report given to [facility]. Pt awaiting transport.

## 2020-07-26 NOTE — H&P ADULT - ASSESSMENT
54 y/o M s/p ORIF b/l mandible fractures and L orbital floor recon on 5/15/2020, c/b left submental abscess requiring drainage in the office and PO antibiotics presenting with left jaw pain. CT scan showing increased lucency of left mandible and periosteal reaction, bone healing versus osteomyelitis.  - admit to plastic surgery  - plan for removal of left mandibular hardware, possible removal of left first and second mandibular molars  - IV unasyn  - soft diet  - NPO after midnight, IVF  - PRN pain medication: tylenol, oxy  - peridex  - Lovenox  - ambulate as tolerated  - vitals, I/O  - discussed with Dr. Edi Lamb MD PGY4  Plastic Surgery (pg [facility]: [id number], [facility]: [phone])

## 2020-07-26 NOTE — H&P ADULT - NSHPLABSRESULTS_GEN_ALL_CORE
Labs:                       14.0   5.00  )-----------( 168      ( 26 Jul 2020 15:35 )             42.7   07-26    142  |  104  |  10  ----------------------------<  95  4.4   |  25  |  1.09    Ca    9.3      26 Jul 2020 15:35    TPro  6.8  /  Alb  3.9  /  TBili  0.7  /  DBili  x   /  AST  22  /  ALT  13  /  AlkPhos  97  07-26      < from: CT Maxillofacial w/ IV Cont (07.26.20 @ 16:15) >    INTERPRETATION:  .    CLINICAL INFORMATION: Previous fracture. Possible infection.    TECHNIQUE: Axial CT images were obtained through the paranasal sinuses, and orbits. 90 cc's of IV Omnipaque 300 was administered without immediate complication and 10 cc's was discarded. Coronal and sagittal reconstruction images were also obtained. 3D reconstructed images were also performed and reviewed.    COMPARISON: Prior maxillofacial CT study from 5/15/2020    FINDINGS: Again seen is that the patient is status post ORIF of bilateral mandibular fractures with microscrews and dynamic compression plates. Alignment is near-anatomic. The orthopedic hardware is intact. Postoperative air has resolved in the interim. Worsening mottled lucency and sclerosis is seen within the left mandibular angle with associated new periosteal reaction involving the lingual cortex. The posterior most screw traverses through this area. Otherwise, there is no evidence of screw loosening. Additional nonspecific perimandibular soft tissue swelling remains with adjacent subcutaneous fat stranding, left side worse than right. There is thickening of the left platysma muscle. Enlarged left-sided level Ib lymph nodes are seen which are reactive. There is no abscess. A partially fractured versus carious left-sided first and second mandibular molar teeth are again seen. The bilateral third mandibular molar teeth have been extracted.    A chronic depressed left-sided orbital floor fracture is again seen in the medial to the infraorbital foramen and canal. The degree of depression appears unchanged when compared to the prior study. Herniation of fat is seen. A left-sided scleralband is again noted. There is evidence of left-sided cataract removal. The right orbit inclusive of the globes, extraocular muscles, optic nerves, and orbital fat appear within normal limits.    Bilateral nasal bone deformities appear unchanged.    Apolyp versus retention cyst is seen within the left sphenoid sinus. Additional tiny polyps are seen within the right maxillary sinus. The mastoid air cells are clear.    The zygomatic arches are intact.    IMPRESSION:    1. Redemonstration of bilateral ORIF for mandibular fractures.    2. Worsening of mottled lucency and sclerosis involving the left mandibular angle with new periosteal reaction at the lingual surface. Findings may reflect a combination of healing fracture and/or osteomyelitis. Associated perimandibular soft tissue swelling and subcutaneous inflammation without abscess formation. Reactive left-sided cervical lymph nodes.    3. Carious versus fractured left-sided first and second mandibular molar teeth near the area of fractureand/or osteomyelitis. Correlate with dental exam.    4. Unchanged appearing depressed left-sided orbital floor fracture and bilateral nasal bone deformities.    < end of copied text >

## 2020-07-26 NOTE — H&P ADULT - NSHPPHYSICALEXAM_GEN_ALL_CORE
General: Well developed, well nourished, NAD  Neuro: Alert and oriented, no focal deficits, moves all extremities spontaneously  Gen: NAD, AAOx3    HEENT: left lower jaw mild edema  PERRL, EOMI, no proptosis  Intraorally no ecchymosis, tender to palpation along left mandible, no expressible purulence from left 3rd molar extraction site   Left neck with palpable submental area of induration and tenderness, no open wounds, no erythema    Respiratory: Airway patent, respirations unlabored  Extremities: No edema, sensation and movement grossly intact  Skin: Warm, dry, appropriate color

## 2020-07-27 ENCOUNTER — RESULT REVIEW (OUTPATIENT)
Age: 55
End: 2020-07-27

## 2020-07-27 LAB
BLD GP AB SCN SERPL QL: NEGATIVE — SIGNIFICANT CHANGE UP
RH IG SCN BLD-IMP: POSITIVE — SIGNIFICANT CHANGE UP

## 2020-07-27 PROCEDURE — 41899 UNLISTED PX DENTALVLR STRUX: CPT | Mod: LT,78

## 2020-07-27 PROCEDURE — 20680 REMOVAL OF IMPLANT DEEP: CPT | Mod: 78

## 2020-07-27 PROCEDURE — 21025 EXCISION OF BONE LOWER JAW: CPT | Mod: 78

## 2020-07-27 PROCEDURE — 88300 SURGICAL PATH GROSS: CPT | Mod: 26

## 2020-07-27 RX ORDER — ONDANSETRON 8 MG/1
4 TABLET, FILM COATED ORAL ONCE
Refills: 0 | Status: DISCONTINUED | OUTPATIENT
Start: 2020-07-27 | End: 2020-07-27

## 2020-07-27 RX ORDER — METOCLOPRAMIDE HCL 10 MG
10 TABLET ORAL ONCE
Refills: 0 | Status: DISCONTINUED | OUTPATIENT
Start: 2020-07-27 | End: 2020-07-27

## 2020-07-27 RX ORDER — ENOXAPARIN SODIUM 100 MG/ML
40 INJECTION SUBCUTANEOUS DAILY
Refills: 0 | Status: DISCONTINUED | OUTPATIENT
Start: 2020-07-27 | End: 2020-07-28

## 2020-07-27 RX ORDER — HYDROMORPHONE HYDROCHLORIDE 2 MG/ML
1 INJECTION INTRAMUSCULAR; INTRAVENOUS; SUBCUTANEOUS
Refills: 0 | Status: DISCONTINUED | OUTPATIENT
Start: 2020-07-27 | End: 2020-07-27

## 2020-07-27 RX ORDER — AMPICILLIN SODIUM AND SULBACTAM SODIUM 250; 125 MG/ML; MG/ML
3 INJECTION, POWDER, FOR SUSPENSION INTRAMUSCULAR; INTRAVENOUS EVERY 6 HOURS
Refills: 0 | Status: DISCONTINUED | OUTPATIENT
Start: 2020-07-27 | End: 2020-07-28

## 2020-07-27 RX ORDER — HYDROMORPHONE HYDROCHLORIDE 2 MG/ML
0.5 INJECTION INTRAMUSCULAR; INTRAVENOUS; SUBCUTANEOUS
Refills: 0 | Status: DISCONTINUED | OUTPATIENT
Start: 2020-07-27 | End: 2020-07-27

## 2020-07-27 RX ORDER — LANOLIN ALCOHOL/MO/W.PET/CERES
3 CREAM (GRAM) TOPICAL AT BEDTIME
Refills: 0 | Status: DISCONTINUED | OUTPATIENT
Start: 2020-07-27 | End: 2020-07-28

## 2020-07-27 RX ORDER — ACETAMINOPHEN 500 MG
650 TABLET ORAL EVERY 6 HOURS
Refills: 0 | Status: DISCONTINUED | OUTPATIENT
Start: 2020-07-27 | End: 2020-07-28

## 2020-07-27 RX ORDER — SENNA PLUS 8.6 MG/1
2 TABLET ORAL AT BEDTIME
Refills: 0 | Status: DISCONTINUED | OUTPATIENT
Start: 2020-07-27 | End: 2020-07-28

## 2020-07-27 RX ORDER — CALCIUM CARBONATE 500(1250)
1250 TABLET ORAL EVERY 4 HOURS
Refills: 0 | Status: DISCONTINUED | OUTPATIENT
Start: 2020-07-27 | End: 2020-07-28

## 2020-07-27 RX ORDER — DIPHENHYDRAMINE HCL 50 MG
25 CAPSULE ORAL EVERY 4 HOURS
Refills: 0 | Status: DISCONTINUED | OUTPATIENT
Start: 2020-07-27 | End: 2020-07-28

## 2020-07-27 RX ORDER — CHLORHEXIDINE GLUCONATE 213 G/1000ML
15 SOLUTION TOPICAL
Refills: 0 | Status: DISCONTINUED | OUTPATIENT
Start: 2020-07-27 | End: 2020-07-28

## 2020-07-27 RX ORDER — OXYCODONE HYDROCHLORIDE 5 MG/1
5 TABLET ORAL EVERY 4 HOURS
Refills: 0 | Status: DISCONTINUED | OUTPATIENT
Start: 2020-07-27 | End: 2020-07-28

## 2020-07-27 RX ORDER — ONDANSETRON 8 MG/1
4 TABLET, FILM COATED ORAL EVERY 6 HOURS
Refills: 0 | Status: DISCONTINUED | OUTPATIENT
Start: 2020-07-27 | End: 2020-07-28

## 2020-07-27 RX ORDER — OXYCODONE HYDROCHLORIDE 5 MG/1
10 TABLET ORAL EVERY 4 HOURS
Refills: 0 | Status: DISCONTINUED | OUTPATIENT
Start: 2020-07-27 | End: 2020-07-28

## 2020-07-27 RX ADMIN — ENOXAPARIN SODIUM 40 MILLIGRAM(S): 100 INJECTION SUBCUTANEOUS at 11:44

## 2020-07-27 RX ADMIN — OXYCODONE HYDROCHLORIDE 5 MILLIGRAM(S): 5 TABLET ORAL at 05:32

## 2020-07-27 RX ADMIN — AMPICILLIN SODIUM AND SULBACTAM SODIUM 200 GRAM(S): 250; 125 INJECTION, POWDER, FOR SUSPENSION INTRAMUSCULAR; INTRAVENOUS at 22:13

## 2020-07-27 RX ADMIN — OXYCODONE HYDROCHLORIDE 5 MILLIGRAM(S): 5 TABLET ORAL at 05:01

## 2020-07-27 RX ADMIN — CHLORHEXIDINE GLUCONATE 15 MILLILITER(S): 213 SOLUTION TOPICAL at 05:01

## 2020-07-27 RX ADMIN — Medication 650 MILLIGRAM(S): at 20:55

## 2020-07-27 RX ADMIN — OXYCODONE HYDROCHLORIDE 10 MILLIGRAM(S): 5 TABLET ORAL at 23:00

## 2020-07-27 RX ADMIN — SODIUM CHLORIDE 100 MILLILITER(S): 9 INJECTION, SOLUTION INTRAVENOUS at 00:26

## 2020-07-27 RX ADMIN — OXYCODONE HYDROCHLORIDE 5 MILLIGRAM(S): 5 TABLET ORAL at 12:15

## 2020-07-27 RX ADMIN — Medication 650 MILLIGRAM(S): at 20:26

## 2020-07-27 RX ADMIN — OXYCODONE HYDROCHLORIDE 5 MILLIGRAM(S): 5 TABLET ORAL at 00:25

## 2020-07-27 RX ADMIN — OXYCODONE HYDROCHLORIDE 5 MILLIGRAM(S): 5 TABLET ORAL at 00:55

## 2020-07-27 RX ADMIN — OXYCODONE HYDROCHLORIDE 10 MILLIGRAM(S): 5 TABLET ORAL at 22:29

## 2020-07-27 RX ADMIN — AMPICILLIN SODIUM AND SULBACTAM SODIUM 200 GRAM(S): 250; 125 INJECTION, POWDER, FOR SUSPENSION INTRAMUSCULAR; INTRAVENOUS at 03:15

## 2020-07-27 RX ADMIN — AMPICILLIN SODIUM AND SULBACTAM SODIUM 200 GRAM(S): 250; 125 INJECTION, POWDER, FOR SUSPENSION INTRAMUSCULAR; INTRAVENOUS at 09:01

## 2020-07-27 RX ADMIN — CHLORHEXIDINE GLUCONATE 15 MILLILITER(S): 213 SOLUTION TOPICAL at 22:13

## 2020-07-27 RX ADMIN — Medication 3 MILLIGRAM(S): at 00:25

## 2020-07-27 NOTE — PROGRESS NOTE ADULT - SUBJECTIVE AND OBJECTIVE BOX
Plastic Surgery Progress Note    Patient is a 55y old  Male who presents with a chief complaint of left jaw pain (26 Jul 2020 18:49)    SUBJECTIVE: Patient seen and examined at bedside with surgical team. Reporting L jaw pain with speaking and eating food, only able to ingest mushed food and liquids for the past several days. Hasn't had food or drink since midnight. Endorses intermittent numbness over L mandible    Physical Exam  Constitutional: NAD  Face: Mild swelling over L angle of the mandible, with some induration, no areas of fluctuance palpated. Exam limited by patient discomfort.   Respiratory: breathing comfortably on RA  Ext: Grossly normal    Vital Signs Last 24 Hrs  T(C): 36.5 (27 Jul 2020 04:29), Max: 37.1 (26 Jul 2020 18:41)  T(F): 97.7 (27 Jul 2020 04:29), Max: 98.8 (26 Jul 2020 18:41)  HR: 54 (27 Jul 2020 04:29) (52 - 63)  BP: 125/70 (27 Jul 2020 04:29) (110/63 - 138/69)  BP(mean): --  RR: 16 (27 Jul 2020 04:29) (16 - 18)  SpO2: 97% (27 Jul 2020 04:29) (97% - 100%)    I&O's Detail    26 Jul 2020 07:01  -  27 Jul 2020 07:00  --------------------------------------------------------  IN:    IV PiggyBack: 200 mL    lactated ringers.: 600 mL  Total IN: 800 mL    OUT:  Total OUT: 0 mL    Total NET: 800 mL      MEDICATIONS  (STANDING):  ampicillin/sulbactam  IVPB      ampicillin/sulbactam  IVPB 3 Gram(s) IV Intermittent every 6 hours  chlorhexidine 0.12% Liquid 15 milliLiter(s) Swish and Spit two times a day  enoxaparin Injectable 40 milliGRAM(s) SubCutaneous daily  lactated ringers. 1000 milliLiter(s) (100 mL/Hr) IV Continuous <Continuous>    MEDICATIONS  (PRN):  acetaminophen   Tablet .. 650 milliGRAM(s) Oral every 6 hours PRN Mild Pain (1 - 3)  aluminum hydroxide/magnesium hydroxide/simethicone Suspension 30 milliLiter(s) Oral every 4 hours PRN Dyspepsia  bisacodyl 5 milliGRAM(s) Oral daily PRN Constipation  calcium carbonate   Suspension 1250 milliGRAM(s) Oral every 4 hours PRN Dyspepsia  diphenhydrAMINE 25 milliGRAM(s) Oral every 4 hours PRN Rash and/or Itching  melatonin 3 milliGRAM(s) Oral at bedtime PRN Insomnia  ondansetron Injectable 4 milliGRAM(s) IV Push every 6 hours PRN Nausea  oxyCODONE    IR 5 milliGRAM(s) Oral every 4 hours PRN Moderate Pain (4 - 6)  oxyCODONE    IR 10 milliGRAM(s) Oral every 4 hours PRN Severe Pain (7 - 10)  senna 2 Tablet(s) Oral at bedtime PRN Constipation      LABS:                        14.0   5.00  )-----------( 168      ( 26 Jul 2020 15:35 )             42.7     07-26    142  |  104  |  10  ----------------------------<  95  4.4   |  25  |  1.09    Ca    9.3      26 Jul 2020 15:35    TPro  6.8  /  Alb  3.9  /  TBili  0.7  /  DBili  x   /  AST  22  /  ALT  13  /  AlkPhos  97  07-26      LIVER FUNCTIONS - ( 26 Jul 2020 15:35 )  Alb: 3.9 g/dL / Pro: 6.8 g/dL / ALK PHOS: 97 U/L / ALT: 13 U/L / AST: 22 U/L / GGT: x

## 2020-07-27 NOTE — BRIEF OPERATIVE NOTE - NSICDXBRIEFPROCEDURE_GEN_ALL_CORE_FT
PROCEDURES:  Extraction of molar 27-Jul-2020 18:46:15  Giancarlo Donovan  Mandibular plate removal 27-Jul-2020 18:45:57  Giancarlo Donovan

## 2020-07-27 NOTE — PROGRESS NOTE ADULT - ASSESSMENT
54 y/o M s/p ORIF b/l mandible fractures and L orbital floor recon on 5/15/2020, c/b left submental abscess requiring drainage in the office and PO antibiotics presenting with left jaw pain. CT scan showing increased lucency of left mandible and periosteal reaction, bone healing versus osteomyelitis.  - OR today for removal of left mandibular hardware, possible removal of left first and second mandibular molars  - IV unasyn  - NPO  - Pre-op labs

## 2020-07-27 NOTE — BRIEF OPERATIVE NOTE - OPERATION/FINDINGS
Trochar approach to L mandible, L mandibular plate removed with some poor bone stock adjacent to the plate, second molar extracted and some bone debrided by area of previous third molar.

## 2020-07-28 ENCOUNTER — TRANSCRIPTION ENCOUNTER (OUTPATIENT)
Age: 55
End: 2020-07-28

## 2020-07-28 VITALS
SYSTOLIC BLOOD PRESSURE: 138 MMHG | OXYGEN SATURATION: 99 % | HEART RATE: 55 BPM | DIASTOLIC BLOOD PRESSURE: 74 MMHG | TEMPERATURE: 98 F | RESPIRATION RATE: 19 BRPM

## 2020-07-28 PROCEDURE — 80053 COMPREHEN METABOLIC PANEL: CPT

## 2020-07-28 PROCEDURE — 85652 RBC SED RATE AUTOMATED: CPT

## 2020-07-28 PROCEDURE — 86850 RBC ANTIBODY SCREEN: CPT

## 2020-07-28 PROCEDURE — 86140 C-REACTIVE PROTEIN: CPT

## 2020-07-28 PROCEDURE — 70487 CT MAXILLOFACIAL W/DYE: CPT

## 2020-07-28 PROCEDURE — 88300 SURGICAL PATH GROSS: CPT

## 2020-07-28 PROCEDURE — 99285 EMERGENCY DEPT VISIT HI MDM: CPT

## 2020-07-28 PROCEDURE — 86901 BLOOD TYPING SEROLOGIC RH(D): CPT

## 2020-07-28 PROCEDURE — 85027 COMPLETE CBC AUTOMATED: CPT

## 2020-07-28 PROCEDURE — 86900 BLOOD TYPING SEROLOGIC ABO: CPT

## 2020-07-28 RX ORDER — CHLORHEXIDINE GLUCONATE 213 G/1000ML
15 SOLUTION TOPICAL
Qty: 420 | Refills: 0
Start: 2020-07-28 | End: 2020-08-10

## 2020-07-28 RX ORDER — TRAMADOL HYDROCHLORIDE 50 MG/1
1 TABLET ORAL
Qty: 12 | Refills: 0
Start: 2020-07-28 | End: 2020-07-30

## 2020-07-28 RX ORDER — ACETAMINOPHEN 500 MG
2 TABLET ORAL
Qty: 0 | Refills: 0 | DISCHARGE
Start: 2020-07-28

## 2020-07-28 RX ORDER — IBUPROFEN 200 MG
2 TABLET ORAL
Qty: 0 | Refills: 0 | DISCHARGE

## 2020-07-28 RX ADMIN — OXYCODONE HYDROCHLORIDE 10 MILLIGRAM(S): 5 TABLET ORAL at 03:30

## 2020-07-28 RX ADMIN — OXYCODONE HYDROCHLORIDE 10 MILLIGRAM(S): 5 TABLET ORAL at 02:58

## 2020-07-28 RX ADMIN — CHLORHEXIDINE GLUCONATE 15 MILLILITER(S): 213 SOLUTION TOPICAL at 09:37

## 2020-07-28 RX ADMIN — Medication 650 MILLIGRAM(S): at 02:58

## 2020-07-28 RX ADMIN — Medication 650 MILLIGRAM(S): at 03:30

## 2020-07-28 RX ADMIN — AMPICILLIN SODIUM AND SULBACTAM SODIUM 200 GRAM(S): 250; 125 INJECTION, POWDER, FOR SUSPENSION INTRAMUSCULAR; INTRAVENOUS at 06:02

## 2020-07-28 NOTE — DISCHARGE NOTE PROVIDER - NSDCMRMEDTOKEN_GEN_ALL_CORE_FT
chlorhexidine 0.12% mucous membrane liquid: 15 milliliter(s) mucous membrane once a day (at bedtime)   oxyCODONE 5 mg oral tablet: 1 tab(s) orally every 4 hours, As needed, Moderate Pain (4 - 6) MDD:6 acetaminophen 325 mg oral tablet: 2 tab(s) orally every 6 hours, As needed, Mild Pain (1 - 3)  Augmentin 875 mg-125 mg oral tablet: 1 tab(s) orally every 12 hours MDD:2 tabs  chlorhexidine 0.12% mucous membrane liquid: 15 milliliter(s) mucous membrane 2 times a day   ibuprofen 200 mg oral tablet: 2 tab(s) orally every 6 hours, As Needed for pain  traMADol 50 mg oral tablet: 1 tab(s) orally every 6 hours, As Needed -for severe pain MDD:4 tabs

## 2020-07-28 NOTE — DISCHARGE NOTE NURSING/CASE MANAGEMENT/SOCIAL WORK - PATIENT PORTAL LINK FT
You can access the FollowMyHealth Patient Portal offered by [facility] by registering at the following website: [email or web address]. By joining [facility]’s FollowMyHealth portal, you will also be able to view your health information using other applications (apps) compatible with our system.

## 2020-07-28 NOTE — DISCHARGE NOTE PROVIDER - NSDCFUADDAPPT_GEN_ALL_CORE_FT
Please follow up with Dr. Daley within 1 week of discharge from the hospital. You may call [phone] to schedule an appointment.

## 2020-07-28 NOTE — DISCHARGE NOTE PROVIDER - HOSPITAL COURSE
56 y/o M presented to the hospital complaining of L sided swelling and discomfort. Patient had a history of a bilateral mandible fracture with open reduction, internal fixation in May. Patient was admitted to Dr. Daley, started on IV antibiotics and pre-oped for surgery. Patient was taken to the OR for removal of L mandible plate and debridement of surrounding bone. Patient tolerated the procedure well and recovered in PACU and was sent to the surgical floor. Patient was started on a liquid diet and continued on antibiotic therapy. Patient was sent home on antibiotic therapy, pain medication and follow up instructions.

## 2020-07-28 NOTE — DISCHARGE NOTE PROVIDER - NSDCFUADDINST_GEN_ALL_CORE_FT
Please take your antibiotics sent to your pharmacy as prescribed.     Please continue on a liquid diet until your follow up with Dr. Daley.     Sleep with your head elevated to help reduce swelling, may use ice packs to L side of face to help reduce swelling and pain relief.

## 2020-07-28 NOTE — DISCHARGE NOTE NURSING/CASE MANAGEMENT/SOCIAL WORK - NSDCFUADDAPPT_GEN_ALL_CORE_FT
Please follow up with Dr. Daley within 1 week of discharge from the hospital. You may call [phone] to schedule an appointment.

## 2020-07-28 NOTE — PROGRESS NOTE ADULT - ASSESSMENT
Pt is a 55M s/p ORIF of b/l mandible fractures and orbital floor in May, returned with jaw pain and swelling with presumed hardware infection, now s/p removal of left-side mandibular plate and screws, debrided devitalized bone and extracted left second molar, progressing appropriately    - can be discharged today with 1 week Augmentin  - continue liquid diet at home  - HOB elevation and ice packs as needed  - pain meds for home as desired    Plastic Surgery [phone]

## 2020-07-28 NOTE — PROGRESS NOTE ADULT - SUBJECTIVE AND OBJECTIVE BOX
Plastic Surgery Progress Note    Subjective: seen on rounds, no issues, pain controlled and tolerating clears    Exam:    Neuro: Alert  GA: well-appearing  Pulm: breathing comfortably  HEENT: expected swelling around left jaw, minimally appropriately tender, intraoral incision intact    Vital Signs Last 24 Hrs  T(C): 36.6 (28 Jul 2020 04:54), Max: 36.9 (27 Jul 2020 20:10)  T(F): 97.8 (28 Jul 2020 04:54), Max: 98.5 (27 Jul 2020 20:10)  HR: 57 (28 Jul 2020 04:54) (45 - 86)  BP: 105/56 (28 Jul 2020 04:54) (105/56 - 238/85)  BP(mean): 104 (27 Jul 2020 19:00) (102 - 118)  RR: 19 (28 Jul 2020 04:54) (15 - 26)  SpO2: 98% (28 Jul 2020 04:54) (95% - 100%)    I&O's Detail    27 Jul 2020 07:01  -  28 Jul 2020 07:00  --------------------------------------------------------  IN:    lactated ringers.: 105 mL  Total IN: 105 mL    OUT:    Voided: 750 mL  Total OUT: 750 mL    Total NET: -645 mL      MEDICATIONS  (STANDING):  ampicillin/sulbactam  IVPB 3 Gram(s) IV Intermittent every 6 hours  chlorhexidine 0.12% Liquid 15 milliLiter(s) Swish and Spit two times a day  enoxaparin Injectable 40 milliGRAM(s) SubCutaneous daily    MEDICATIONS  (PRN):  acetaminophen   Tablet .. 650 milliGRAM(s) Oral every 6 hours PRN Mild Pain (1 - 3)  aluminum hydroxide/magnesium hydroxide/simethicone Suspension 30 milliLiter(s) Oral every 4 hours PRN Dyspepsia  bisacodyl 5 milliGRAM(s) Oral daily PRN Constipation  calcium carbonate   Suspension 1250 milliGRAM(s) Oral every 4 hours PRN Dyspepsia  diphenhydrAMINE 25 milliGRAM(s) Oral every 4 hours PRN Rash and/or Itching  melatonin 3 milliGRAM(s) Oral at bedtime PRN Insomnia  ondansetron Injectable 4 milliGRAM(s) IV Push every 6 hours PRN Nausea  oxyCODONE    IR 5 milliGRAM(s) Oral every 4 hours PRN Moderate Pain (4 - 6)  oxyCODONE    IR 10 milliGRAM(s) Oral every 4 hours PRN Severe Pain (7 - 10)  senna 2 Tablet(s) Oral at bedtime PRN Constipation      LABS:                        14.0   5.00  )-----------( 168      ( 26 Jul 2020 15:35 )             42.7     07-26    142  |  104  |  10  ----------------------------<  95  4.4   |  25  |  1.09    Ca    9.3      26 Jul 2020 15:35    TPro  6.8  /  Alb  3.9  /  TBili  0.7  /  DBili  x   /  AST  22  /  ALT  13  /  AlkPhos  97  07-26      LIVER FUNCTIONS - ( 26 Jul 2020 15:35 )  Alb: 3.9 g/dL / Pro: 6.8 g/dL / ALK PHOS: 97 U/L / ALT: 13 U/L / AST: 22 U/L / GGT: x             ABO Interpretation: GERSON (07-27-20 @ 08:27)

## 2020-07-28 NOTE — DISCHARGE NOTE PROVIDER - CARE PROVIDER_API CALL
Reed Daley (MD)  Plastic Surgery; Surgery  [address], [address]  [place] [address]  Phone: [phone]  Fax: [phone]  Follow Up Time:

## 2020-07-28 NOTE — DISCHARGE NOTE PROVIDER - NSDCCPCAREPLAN_GEN_ALL_CORE_FT
PRINCIPAL DISCHARGE DIAGNOSIS  Diagnosis: Removal of pin, plate, geneva, or screw  Assessment and Plan of Treatment:       SECONDARY DISCHARGE DIAGNOSES  Diagnosis: Surgical site reaction, initial encounter  Assessment and Plan of Treatment:

## 2020-07-30 LAB — SURGICAL PATHOLOGY STUDY: SIGNIFICANT CHANGE UP

## 2020-08-03 ENCOUNTER — APPOINTMENT (OUTPATIENT)
Dept: PLASTIC SURGERY | Facility: CLINIC | Age: 55
End: 2020-08-03
Payer: MEDICAID

## 2020-08-03 PROCEDURE — 99024 POSTOP FOLLOW-UP VISIT: CPT

## 2020-08-03 NOTE — REASON FOR VISIT
[Post Op: _________] : a [unfilled] post op visit [FreeTextEntry1] : DOS: 05/15/2020 s/p ORIF of Bilateral mandibular comminuted fracture with left angle fracture, right body fracture; Extraction of left mandibular third molar; repair of left orbital floor and medial wall blowout fracture with implant and bone graft to correct enophthalmos. Pt is doing well, denies any other concerns.

## 2020-08-17 ENCOUNTER — APPOINTMENT (OUTPATIENT)
Dept: PLASTIC SURGERY | Facility: CLINIC | Age: 55
End: 2020-08-17
Payer: MEDICAID

## 2020-08-17 PROCEDURE — 99212 OFFICE O/P EST SF 10 MIN: CPT

## 2020-08-19 NOTE — REASON FOR VISIT
[Follow-Up: _____] : a [unfilled] follow-up visit [FreeTextEntry1] : DOS: 05/15/2020 s/p ORIF of Bilateral mandibular comminuted fracture with left angle fracture, right body fracture; Extraction of left mandibular third molar; repair of left orbital floor and medial wall blowout fracture with implant and bone graft to correct enophthalmos.

## 2020-10-28 ENCOUNTER — EMERGENCY (EMERGENCY)
Facility: HOSPITAL | Age: 55
LOS: 1 days | Discharge: HOME | End: 2020-10-28
Attending: EMERGENCY MEDICINE
Payer: COMMERCIAL

## 2020-10-28 VITALS
DIASTOLIC BLOOD PRESSURE: 81 MMHG | SYSTOLIC BLOOD PRESSURE: 131 MMHG | TEMPERATURE: 98 F | OXYGEN SATURATION: 100 % | RESPIRATION RATE: 16 BRPM | HEART RATE: 70 BPM

## 2020-10-28 VITALS
SYSTOLIC BLOOD PRESSURE: 132 MMHG | RESPIRATION RATE: 18 BRPM | HEIGHT: 68 IN | TEMPERATURE: 99 F | OXYGEN SATURATION: 96 % | DIASTOLIC BLOOD PRESSURE: 75 MMHG | HEART RATE: 86 BPM | WEIGHT: 195.11 LBS

## 2020-10-28 LAB
ALBUMIN SERPL ELPH-MCNC: 4.2 G/DL — SIGNIFICANT CHANGE UP (ref 3.3–5)
ALP SERPL-CCNC: 97 U/L — SIGNIFICANT CHANGE UP (ref 40–120)
ALT FLD-CCNC: 21 U/L — SIGNIFICANT CHANGE UP (ref 10–45)
ANION GAP SERPL CALC-SCNC: 8 MMOL/L — SIGNIFICANT CHANGE UP (ref 5–17)
AST SERPL-CCNC: 19 U/L — SIGNIFICANT CHANGE UP (ref 10–40)
BASOPHILS # BLD AUTO: 0.01 K/UL — SIGNIFICANT CHANGE UP (ref 0–0.2)
BASOPHILS NFR BLD AUTO: 0.1 % — SIGNIFICANT CHANGE UP (ref 0–2)
BILIRUB SERPL-MCNC: 0.6 MG/DL — SIGNIFICANT CHANGE UP (ref 0.2–1.2)
BUN SERPL-MCNC: 10 MG/DL — SIGNIFICANT CHANGE UP (ref 7–23)
CALCIUM SERPL-MCNC: 9.5 MG/DL — SIGNIFICANT CHANGE UP (ref 8.4–10.5)
CHLORIDE SERPL-SCNC: 104 MMOL/L — SIGNIFICANT CHANGE UP (ref 96–108)
CO2 SERPL-SCNC: 28 MMOL/L — SIGNIFICANT CHANGE UP (ref 22–31)
CREAT SERPL-MCNC: 1.07 MG/DL — SIGNIFICANT CHANGE UP (ref 0.5–1.3)
EOSINOPHIL # BLD AUTO: 0.05 K/UL — SIGNIFICANT CHANGE UP (ref 0–0.5)
EOSINOPHIL NFR BLD AUTO: 0.6 % — SIGNIFICANT CHANGE UP (ref 0–6)
GLUCOSE SERPL-MCNC: 88 MG/DL — SIGNIFICANT CHANGE UP (ref 70–99)
HCT VFR BLD CALC: 46.1 % — SIGNIFICANT CHANGE UP (ref 39–50)
HGB BLD-MCNC: 15.4 G/DL — SIGNIFICANT CHANGE UP (ref 13–17)
IMM GRANULOCYTES NFR BLD AUTO: 0.4 % — SIGNIFICANT CHANGE UP (ref 0–1.5)
LYMPHOCYTES # BLD AUTO: 1.89 K/UL — SIGNIFICANT CHANGE UP (ref 1–3.3)
LYMPHOCYTES # BLD AUTO: 23.9 % — SIGNIFICANT CHANGE UP (ref 13–44)
MCHC RBC-ENTMCNC: 30.1 PG — SIGNIFICANT CHANGE UP (ref 27–34)
MCHC RBC-ENTMCNC: 33.4 GM/DL — SIGNIFICANT CHANGE UP (ref 32–36)
MCV RBC AUTO: 90 FL — SIGNIFICANT CHANGE UP (ref 80–100)
MONOCYTES # BLD AUTO: 0.69 K/UL — SIGNIFICANT CHANGE UP (ref 0–0.9)
MONOCYTES NFR BLD AUTO: 8.7 % — SIGNIFICANT CHANGE UP (ref 2–14)
NEUTROPHILS # BLD AUTO: 5.23 K/UL — SIGNIFICANT CHANGE UP (ref 1.8–7.4)
NEUTROPHILS NFR BLD AUTO: 66.3 % — SIGNIFICANT CHANGE UP (ref 43–77)
NRBC # BLD: 0 /100 WBCS — SIGNIFICANT CHANGE UP (ref 0–0)
PLATELET # BLD AUTO: 159 K/UL — SIGNIFICANT CHANGE UP (ref 150–400)
POTASSIUM SERPL-MCNC: 4 MMOL/L — SIGNIFICANT CHANGE UP (ref 3.5–5.3)
POTASSIUM SERPL-SCNC: 4 MMOL/L — SIGNIFICANT CHANGE UP (ref 3.5–5.3)
PROT SERPL-MCNC: 7.2 G/DL — SIGNIFICANT CHANGE UP (ref 6–8.3)
RBC # BLD: 5.12 M/UL — SIGNIFICANT CHANGE UP (ref 4.2–5.8)
RBC # FLD: 13.2 % — SIGNIFICANT CHANGE UP (ref 10.3–14.5)
SODIUM SERPL-SCNC: 140 MMOL/L — SIGNIFICANT CHANGE UP (ref 135–145)
WBC # BLD: 7.9 K/UL — SIGNIFICANT CHANGE UP (ref 3.8–10.5)
WBC # FLD AUTO: 7.9 K/UL — SIGNIFICANT CHANGE UP (ref 3.8–10.5)

## 2020-10-28 PROCEDURE — 99284 EMERGENCY DEPT VISIT MOD MDM: CPT | Mod: 25

## 2020-10-28 PROCEDURE — 70487 CT MAXILLOFACIAL W/DYE: CPT

## 2020-10-28 PROCEDURE — 99285 EMERGENCY DEPT VISIT HI MDM: CPT

## 2020-10-28 PROCEDURE — 70487 CT MAXILLOFACIAL W/DYE: CPT | Mod: 26

## 2020-10-28 PROCEDURE — 85025 COMPLETE CBC W/AUTO DIFF WBC: CPT

## 2020-10-28 PROCEDURE — 96375 TX/PRO/DX INJ NEW DRUG ADDON: CPT | Mod: XU

## 2020-10-28 PROCEDURE — 96374 THER/PROPH/DIAG INJ IV PUSH: CPT | Mod: XU

## 2020-10-28 PROCEDURE — 80053 COMPREHEN METABOLIC PANEL: CPT

## 2020-10-28 RX ORDER — KETOROLAC TROMETHAMINE 30 MG/ML
15 SYRINGE (ML) INJECTION ONCE
Refills: 0 | Status: DISCONTINUED | OUTPATIENT
Start: 2020-10-28 | End: 2020-10-28

## 2020-10-28 RX ADMIN — Medication 100 MILLIGRAM(S): at 17:08

## 2020-10-28 RX ADMIN — Medication 15 MILLIGRAM(S): at 17:08

## 2020-10-28 NOTE — ED PROVIDER NOTE - PHYSICAL EXAMINATION
On Physical Exam:  General: well appearing, in NAD, speaking clearly in full sentences and without difficulty; cooperative with exam  HEENT: PERRL, L side of Jaw tenderness, 2cm hardened area to L side of Jaw, difficulty fully opening mouth. Oral area clear  Neck: no neck tenderness, no nuchal rigidity  Cardiac: normal s1, s2; RRR; no MGR  Lungs: CTABL  Abdomen: soft nontender/nondistended  : no bladder tenderness or distension  Skin: warm, intact, no rash  Extremities: no peripheral edema, no gross deformities  Neuro: no gross neurologic deficits

## 2020-10-28 NOTE — ED ADULT NURSE NOTE - OBJECTIVE STATEMENT
54 y/o M, no PMH, presenting to the ED c/o L jaw pain/swelling and difficulty opening mouth fully due to pain/swelling. Pt had jaw fracture 3 months ago s/p surgical repaired with a plate that then got infected and had to have repeat surgery to have plate removed. Pt reports 3 days of increased L jaw pain, pt denies any injury to the area. Pt denies headache, dizziness, chest pain, palpitations, cough, SOB, abdominal pain, n/v/d, fevers, chills, weakness at this time. Safety maintained.

## 2020-10-28 NOTE — ED PROVIDER NOTE - OBJECTIVE STATEMENT
55 year old male with no pmhx presents to the ED c.o jaw pain and difficulty opening mouth. patient reports jaw fx 3 months ago that was surgically repaired with a plate that got infected and had to have plate removed. patient reports being seen by "Dr. house" post the he was supposed to FU with  in 6 weeks that he has not. patient states the last few days he has had increased pain to L side of jaw with sensation of a hardness inside with difficulty opening the mouth fully. patient denies injury, CP, SOB, fever, difficulty eating, difficulty speaking, N/V/D, confusion, weakness. 55 year old male with no pmhx presents to the ED c.o jaw pain and difficulty opening mouth. patient reports jaw fx 3 months ago that was surgically repaired with a plate that got infected and had to have plate removed. patient reports being seen by "Dr. house" post the he was supposed to FU with  in 6 weeks that he has not. patient states the last few days he has had increased pain to L side of jaw with sensation of a hardness inside with difficulty opening the mouth fully. patient denies injury, CP, SOB, fever, difficulty eating, difficulty speaking, N/V/D, confusion, weakness.    Jaw Surgeon- Dr. house [phone]

## 2020-10-28 NOTE — ED PROVIDER NOTE - NS ED ROS FT
Review of Systems:  · Constitutional: no chills, no fever, no night sweats, no weight loss  · Nose: no epistaxis  · Mouth/Throat: L sided Jaw pain, difficulty opening mouth fully, no difficulty in swallowing, trachea midline, uvula midline  . Cardio: No CP, no palpitations, no chest pressure  · Respiratory: no cough, no exertional dyspnea, no hemoptysis, no orthopnea, no shortness of breath  · Gastrointestinal: no abdominal pain, no diarrhea, no melena, no nausea, no vomiting  · Genitourinary: no difficulty urinating, no dysuria, no hematuria  · MUSCULOSKELETAL: FROM of all extremities  · Skin: no abrasion; no bruising; no laceration  · Neurological: no change in level of consciousness, no headache, no seizures  · Psychiatric: no anxiety, no depression  · Endocrine: no excessive urination  · Heme/Lymph: no anemia, no easy bleeding  · Allergic/Immunologic: IMMUNIZATIONS UTD  · ROS STATEMENT: all other ROS negative except as per HPI

## 2020-10-28 NOTE — ED PROVIDER NOTE - CARE PROVIDER_API CALL
Reed Daley (MD)  Plastic Surgery; Surgery  [address], [address]  [place] [address]  Phone: [phone]  Fax: [phone]  Follow Up Time:

## 2020-10-28 NOTE — ED PROVIDER NOTE - PROGRESS NOTE DETAILS
Discussed with Dr. Daley patient's Surgeon. advising for patient to fu outpatient will obtain work up and reasses. Jose Blanchard PA-C CT with signs of cellulitis vs osteomyelitis. Discussed with Dr. Daley who agrees to see patient on Monday, start antibiotics and likely MRI as outpatient.

## 2020-10-28 NOTE — ED PROVIDER NOTE - NSFOLLOWUPINSTRUCTIONS_ED_ALL_ED_FT
You were seen in the ED for jaw pain  The following labs/imaging were obtained: see attached (if applicable)  Take clindamycin for 7 days. See label instructions.   Return to the ED if you develop fever,  inability to swallow, worsening or new concerning symptoms.  Follow up with Dr. Daley within 1 week.   Discussed with pt results of work up, strict return precautions, and need for follow up.  Pt expressed understanding and agrees with plan.

## 2020-10-28 NOTE — ED PROVIDER NOTE - ATTENDING CONTRIBUTION TO CARE
55 year old male with no pmhx presents to the ED c.o l jaw swallowing patient reports jaw fx 3 months ago that was surgically repaired with a plate that got infected here for swallowing to the same zaid, more bony tend, no flocuence.  Discussed with dr house if no findings on ct to f/u in office, abx ordered, analgesia, ct, labs, mild trismus, no fever, vss, well appearing.

## 2020-10-28 NOTE — ED PROVIDER NOTE - PATIENT PORTAL LINK FT
You can access the FollowMyHealth Patient Portal offered by [facility] by registering at the following website: [email or web address]. By joining [employer]’s FollowMyHealth portal, you will also be able to view your health information using other applications (apps) compatible with our system.

## 2020-11-02 ENCOUNTER — APPOINTMENT (OUTPATIENT)
Dept: PLASTIC SURGERY | Facility: CLINIC | Age: 55
End: 2020-11-02

## 2022-03-14 NOTE — PRE-OP CHECKLIST - IV STARTED
Addended by: MARCO ANTONIO BLACKMAN on: 3/14/2022 07:48 AM     Modules accepted: Orders     yes/in place from floor

## 2022-04-13 ENCOUNTER — EMERGENCY (EMERGENCY)
Facility: HOSPITAL | Age: 57
LOS: 1 days | Discharge: HOME | End: 2022-04-13
Attending: EMERGENCY MEDICINE
Payer: MEDICAID

## 2022-04-13 VITALS
HEIGHT: 68 IN | HEART RATE: 76 BPM | DIASTOLIC BLOOD PRESSURE: 92 MMHG | RESPIRATION RATE: 18 BRPM | WEIGHT: 179.9 LBS | OXYGEN SATURATION: 96 % | SYSTOLIC BLOOD PRESSURE: 150 MMHG | TEMPERATURE: 98 F

## 2022-04-13 LAB
APPEARANCE UR: CLEAR — SIGNIFICANT CHANGE UP
BACTERIA # UR AUTO: NEGATIVE — SIGNIFICANT CHANGE UP
BILIRUB UR-MCNC: NEGATIVE — SIGNIFICANT CHANGE UP
COLOR SPEC: YELLOW — SIGNIFICANT CHANGE UP
DIFF PNL FLD: NEGATIVE — SIGNIFICANT CHANGE UP
EPI CELLS # UR: 2 /HPF — SIGNIFICANT CHANGE UP
GLUCOSE UR QL: NEGATIVE — SIGNIFICANT CHANGE UP
HYALINE CASTS # UR AUTO: 26 /LPF — HIGH (ref 0–2)
KETONES UR-MCNC: NEGATIVE — SIGNIFICANT CHANGE UP
LEUKOCYTE ESTERASE UR-ACNC: NEGATIVE — SIGNIFICANT CHANGE UP
NITRITE UR-MCNC: NEGATIVE — SIGNIFICANT CHANGE UP
PH UR: 7.5 — SIGNIFICANT CHANGE UP (ref 5–8)
PROT UR-MCNC: ABNORMAL
RBC CASTS # UR COMP ASSIST: 2 /HPF — SIGNIFICANT CHANGE UP (ref 0–4)
SP GR SPEC: 1.02 — SIGNIFICANT CHANGE UP (ref 1.01–1.02)
UROBILINOGEN FLD QL: ABNORMAL
WBC UR QL: 2 /HPF — SIGNIFICANT CHANGE UP (ref 0–5)

## 2022-04-13 PROCEDURE — 81001 URINALYSIS AUTO W/SCOPE: CPT

## 2022-04-13 PROCEDURE — 99283 EMERGENCY DEPT VISIT LOW MDM: CPT

## 2022-04-13 PROCEDURE — 99284 EMERGENCY DEPT VISIT MOD MDM: CPT

## 2022-04-13 PROCEDURE — 87086 URINE CULTURE/COLONY COUNT: CPT

## 2022-04-13 RX ORDER — ACETAMINOPHEN 500 MG
975 TABLET ORAL ONCE
Refills: 0 | Status: COMPLETED | OUTPATIENT
Start: 2022-04-13 | End: 2022-04-13

## 2022-04-13 RX ADMIN — Medication 975 MILLIGRAM(S): at 22:16

## 2022-04-13 NOTE — ED PROVIDER NOTE - NSFOLLOWUPINSTRUCTIONS_ED_ALL_ED_FT
1. Follow up with your PCP within 2-3 days.   2. Take Ibuprofen (i.e. Motrin, Advil) 600mg every 8 hrs for pain as needed. Take with food.   May alternate with Acetminophen (Tylenol) 650mg every 6 hours for pain as needed.  3. Return to the emergency department if you develop worsening pain, fever, weakness, numbness or any other concerning symptoms.

## 2022-04-13 NOTE — ED PROVIDER NOTE - OBJECTIVE STATEMENT
58 yo M with no reported PMH p/w R flank pain x 4 days. States pain constant since onset. Described as a dull, has never had before. Pain not exacerbated by anything, no reported triggers. Did not take any pain medication. No urinary complaints. Sexually active with females, uses condoms. Denies nausea, vomiting, fever, chills, chest pain, abd pain, penile pain/swelling/dc, testicular pain/swelling, headache, dizziness, weakness.

## 2022-04-13 NOTE — ED PROVIDER NOTE - PHYSICAL EXAMINATION
CONSTITUTIONAL: Well appearing and in no apparent distress.  ENT: Airway patent, moist mucous membranes.   EYES: Pupils equal, round and reactive to light. EOMI. Conjunctiva normal appearing.   CARDIAC: Normal rate, regular rhythm.  Heart sounds S1, S2.    RESPIRATORY: Breath sounds clear and equal bilaterally.   GASTROINTESTINAL: Abdomen soft, non-tender, not distended. No CVAT bilaterally.   MUSCULOSKELETAL: Spine appears normal.  NEUROLOGICAL: Alert and oriented x3, no focal deficits, no motor or sensory deficits. 5/5 muscle strength throughout.  SKIN: Skin normal color, warm, dry and intact.   PSYCHIATRIC: Normal mood and affect.

## 2022-04-13 NOTE — ED PROVIDER NOTE - NS ED ATTENDING STATEMENT MOD
This was a shared visit with the GREGG. I reviewed and verified the documentation and independently performed the documented:

## 2022-04-13 NOTE — ED ADULT TRIAGE NOTE - CHIEF COMPLAINT QUOTE
Pt reports right lower back pain x4 days. Denies burning or frequency with urination. Pt reports right lower back pain x4 days. Denies hematuria, burning or frequency with urination.

## 2022-04-13 NOTE — ED PROVIDER NOTE - PATIENT PORTAL LINK FT
You can access the FollowMyHealth Patient Portal offered by [facility] by registering at the following website: [email or web address]. By joining [employer]’s FollowMyHealth portal, you will also be able to view your health information using other applications (apps) compatible with our system. You can access the FollowMyHealth Patient Portal offered by [facility] by registering at the following website: [email or web address]. By joining [employer]’s FollowMyHealth portal, you will also be able to view your health information using other applications (apps) compatible with our system.

## 2022-04-13 NOTE — ED PROVIDER NOTE - ATTENDING CONTRIBUTION TO CARE
Hunter Wild MD:   I personally saw the patient and performed a substantive portion of the visit including all aspects of the medical decision making.    MDM: Patient with atraumatic R sided low back pain that is just between the lumbar paraspinals and the flank region. However, patient has no red flags or neuro deficits. Low utility for spinal imaging in the ED. Will evaluate with U/A. Will provide pain control.   Patient found to have abnormalities on U/A. Recommended to follow-up with labs and imaging to rule out acute urologic process, but patient states he is nearly asymptomatic, and would prefer to get this done by his PMD. Strict return precautions provided.     I spoke with the patient and recommended further evaluation and management for their symptoms, however they would prefer to go home right now rather than stay for further management in the ED, CDU or hospital. They verbalize understanding that this is not the optimal or recommended plan. Patient is alert and oriented to person, place and time and demonstrates understanding of risks and consequences in their decision. I advised the patient of strict return precautions and encouraged follow up with primary care doctor as soon as possible.

## 2022-04-13 NOTE — ED ADULT NURSE NOTE - TEMPLATE LIST FOR HEAD TO TOE ASSESSMENT
Detail Level: Detailed Instructions (Optional): AREA IS TENDER TO THE TOUCH BUT PT STATES SHE DOES NOT WANT IT ANYTHING DONE AT THIS TIME. INFORMED PT THAT IF SHE DOES WANT IT REMOVED THEN WE WILL REFER HER TO A SURGEON Instructions (Optional): MEASURES 2.1x2.1 AND GETS BOTHERED BY HER BRA STRAP. PT'S SON IS GOING TO CONTACT PCP CONCERNS ANTICOAGULANT PT IS ON TO TALK ABOUT STOPPING RX FOR A FEW DAYS BEFORE AND AFTER PROCEDURE. General

## 2022-04-13 NOTE — ED ADULT NURSE NOTE - OBJECTIVE STATEMENT
58y/o male A&Ox3 speaking coherently independent presents w/ R flank pain for the past 4 days. Pain is intermittent, describes as sharp. Nothing makes better or worse, has not taken any medications. Has never experienced pain like this before, states that he has had lower back pain but "it is usually on both sides not just one." Denies hematuria/dysuria/frequency/burning w/ urination. Denies fevers/chills. No medical hx. Does not appear to be in any acute distress. Denies headache, vision changes, chest pain, shortness of breath, abdominal pain, nausea, vomiting, diarrhea. Safety and comfort measures provided. Bed locked and in lowest position, side rails up for safety. Call bell within reach.

## 2022-04-15 LAB
CULTURE RESULTS: SIGNIFICANT CHANGE UP
SPECIMEN SOURCE: SIGNIFICANT CHANGE UP

## 2022-08-08 NOTE — BRIEF OPERATIVE NOTE - ASSISTANT(S)
Have attempted to call Radha to let her know that I spoke with Christina from Dr. Calzada's office, and they will put her on the list to call if cancellation, in order to get EGD/colonoscopy done sooner.  Asked Radha to call direct number in order for RN to relay message.  
Giancarlo Vasqueztan

## 2022-09-13 ENCOUNTER — EMERGENCY (EMERGENCY)
Facility: HOSPITAL | Age: 57
LOS: 1 days | Discharge: HOME | End: 2022-09-13
Attending: STUDENT IN AN ORGANIZED HEALTH CARE EDUCATION/TRAINING PROGRAM | Admitting: STUDENT IN AN ORGANIZED HEALTH CARE EDUCATION/TRAINING PROGRAM

## 2022-09-13 VITALS
OXYGEN SATURATION: 98 % | SYSTOLIC BLOOD PRESSURE: 154 MMHG | TEMPERATURE: 98 F | HEIGHT: 68 IN | RESPIRATION RATE: 16 BRPM | HEART RATE: 53 BPM | DIASTOLIC BLOOD PRESSURE: 106 MMHG

## 2022-09-13 PROCEDURE — 99284 EMERGENCY DEPT VISIT MOD MDM: CPT

## 2022-09-13 RX ORDER — INDOMETHACIN 50 MG
1 CAPSULE ORAL
Qty: 15 | Refills: 0
Start: 2022-09-13 | End: 2022-09-17

## 2022-09-13 RX ORDER — KETOROLAC TROMETHAMINE 30 MG/ML
30 SYRINGE (ML) INJECTION ONCE
Refills: 0 | Status: DISCONTINUED | OUTPATIENT
Start: 2022-09-13 | End: 2022-09-13

## 2022-09-13 RX ADMIN — Medication 30 MILLIGRAM(S): at 18:03

## 2022-09-13 NOTE — ED PROVIDER NOTE - NS ED ROS FT
ROS   GENERAL: No fever, no chills, no malaise, no fatigue   ENT: No earache, no coryza, no sore throat   NECK: No stiffness, no swollen glands, no dysphagia   RESPIRATORY: No cough, no dyspnea, no pleuritic chest pain   HEART: no chest pain, no palpitations, no edema, no jvd   ABDOMEN: No abdominal pain, no nausea, no vomiting, no diarrhea   : No dysuria, no increase frequency, no urgency, No discharge   MUSCULAR-SKELETAL: POS left great toe pain  NEUROLOGY: No headache, no vertigo, no paresthesia, no focal deficits, no diplopia   SKIN: No rash, no evidence of trauma  All other ROS are negative ROS   GENERAL: No fever, no chills, no malaise, no fatigue   RESPIRATORY: No cough, no dyspnea, no pleuritic chest pain   HEART: no chest pain, no palpitations, no edema, no jvd   ABDOMEN: No abdominal pain, no nausea, no vomiting, no diarrhea   MUSCULAR-SKELETAL: POS left great toe pain  NEUROLOGY: No headache, no vertigo, no paresthesia, no focal deficits, no diplopia   SKIN: No rash, no evidence of trauma  All other ROS are negative

## 2022-09-13 NOTE — ED PROVIDER NOTE - CLINICAL SUMMARY MEDICAL DECISION MAKING FREE TEXT BOX
58 y/o M w/ no pertinent PMHx presents to ED c/o x1 day of left great toe pain. Likely gout. Will obtain bedside ultrasound, suggestive of no cobblestoning. Les likely cellulitis. Discussed the importance of antiinflammatory control and podiatry followup. Discussed risk and benefit of arthrocentesis, pt declined at this time. Will obtain synovial fluid and send for outpatient followup.

## 2022-09-13 NOTE — ED PROVIDER NOTE - PHYSICAL EXAMINATION
GEN - NAD; well appearing; A&O x3   HEAD - NC/AT   EYES- PERRL, EOMI  ENT: Airway patent, mmm, Oral cavity and pharynx normal. No inflammation, swelling, exudate, or lesions.  NECK: Neck supple, non-tender without lymphadenopathy, no masses.  PULMONARY - CTA b/l, symmetric breath sounds. No W/R/R.  CARDIAC -s1s2, RRR, no M,G,R, No JVD  ABDOMEN - +BS, ND, NT, soft, no guarding, no rebound, no masses , no rigidity  : No CVA TTP, no suprapubic TTP  BACK - Normal  spine   EXTREMITIES -  left MTP proximal erythematous and edematous w/ mild TTP.  no pain w/ dorsi flexion of great toe  no calf tenderness or other lower extremity edema bilaterally   SKIN - no rash or bruising   NEUROLOGIC - alert, speech clear, no focal deficits, CN II-XII grossly intact, normal gait, sensation grossly intact  PSYCH -nl mood/affect, nl insight. GEN - NAD; well appearing; A&O x3   PULMONARY - CTA b/l, symmetric breath sounds. No W/R/R.  CARDIAC -s1s2, RRR, no M,G,R, No JVD  BACK - Normal  spine   EXTREMITIES -  left MTP proximal erythematous and edematous w/ mild TTP.  no pain w/ dorsi flexion of great toe  no calf tenderness or other lower extremity edema bilaterally   SKIN - no other rash or bruising   NEUROLOGIC - alert, speech clear, no focal deficits, CN II-XII grossly intact, normal gait, sensation grossly intact  PSYCH -nl mood/affect, nl insight.

## 2022-09-13 NOTE — ED PROVIDER NOTE - NSFOLLOWUPINSTRUCTIONS_ED_ALL_ED_FT
Please take the prescribed indomethacin 50mg three times a day for 5 days.    Please follow up with Podiatry clinic at your scheduled appointment. You should receive a phone call with your appointment date at time in the next 1-2 days. If you do not receive a phone call please contact the number below to confirm your appointment details.    Please return to the ED for any new or concerning symptoms including but not limited to fever, chills, worsening redness, loss of function.    Gout is a condition that causes painful swelling of the joints. Gout is a type of inflammation of the joints (arthritis). This condition is caused by having too much uric acid in the body. Uric acid is a chemical that forms when the body breaks down substances called purines. Purines are important for building body proteins.    When the body has too much uric acid, sharp crystals can form and build up inside the joints. This causes pain and swelling. Gout attacks can happen quickly and may be very painful (acute gout). Over time, the attacks can affect more joints and become more frequent (chronic gout). Gout can also cause uric acid to build up under the skin and inside the kidneys.    What are the causes?  This condition is caused by too much uric acid in your blood. This can happen because:    Your kidneys do not remove enough uric acid from your blood. This is the most common cause.  Your body makes too much uric acid. This can happen with some cancers and cancer treatments. It can also occur if your body is breaking down too many red blood cells (hemolytic anemia).  You eat too many foods that are high in purines. These foods include organ meats and some seafood. Alcohol, especially beer, is also high in purines.    A gout attack may be triggered by trauma or stress.    What increases the risk?  You are more likely to develop this condition if you:    Have a family history of gout.  Are male and middle-aged.  Are female and have gone through menopause.  Are obese.  Frequently drink alcohol, especially beer.  Are dehydrated.  Lose weight too quickly.  Have an organ transplant.  Have lead poisoning.  Take certain medicines, including aspirin, cyclosporine, diuretics, levodopa, and niacin.  Have kidney disease.  Have a skin condition called psoriasis.    What are the signs or symptoms?     An attack of acute gout happens quickly. It usually occurs in just one joint. The most common place is the big toe. Attacks often start at night. Other joints that may be affected include joints of the feet, ankle, knee, fingers, wrist, or elbow. Symptoms of this condition may include:    Severe pain.  Warmth.  Swelling.  Stiffness.  Tenderness. The affected joint may be very painful to touch.  Shiny, red, or purple skin.  Chills and fever.    Chronic gout may cause symptoms more frequently. More joints may be involved. You may also have white or yellow lumps (tophi) on your hands or feet or in other areas near your joints.    How is this diagnosed?  This condition is diagnosed based on your symptoms, medical history, and physical exam. You may have tests, such as:    Blood tests to measure uric acid levels.  Removal of joint fluid with a thin needle (aspiration) to look for uric acid crystals.  X-rays to look for joint damage.    How is this treated?  Treatment for this condition has two phases: treating an acute attack and preventing future attacks. Acute gout treatment may include medicines to reduce pain and swelling, including:    NSAIDs.  Steroids. These are strong anti-inflammatory medicines that can be taken by mouth (orally) or injected into a joint.  Colchicine. This medicine relieves pain and swelling when it is taken soon after an attack. It can be given by mouth or through an IV.    Preventive treatment may include:    Daily use of smaller doses of NSAIDs or colchicine.  Use of a medicine that reduces uric acid levels in your blood.  Changes to your diet. You may need to see a dietitian about what to eat and drink to prevent gout.    Follow these instructions at home:      During a gout attack     If directed, put ice on the affected area:    Put ice in a plastic bag.  Place a towel between your skin and the bag.  Leave the ice on for 20 minutes, 2–3 times a day.  Raise (elevate) the affected joint above the level of your heart as often as possible.  Rest the joint as much as possible. If the affected joint is in your leg, you may be given crutches to use.  Follow instructions from your health care provider about eating or drinking restrictions.        Avoiding future gout attacks    Follow a low-purine diet as told by your dietitian or health care provider. Avoid foods and drinks that are high in purines, including liver, kidney, anchovies, asparagus, herring, mushrooms, mussels, and beer.  Maintain a healthy weight or lose weight if you are overweight. If you want to lose weight, talk with your health care provider. It is important that you do not lose weight too quickly.  Start or maintain an exercise program as told by your health care provider.        Eating and drinking    Drink enough fluids to keep your urine pale yellow.  If you drink alcohol:    Limit how much you use to:    0–1 drink a day for women.  0–2 drinks a day for men.  Be aware of how much alcohol is in your drink. In the [place] one drink equals one 12 oz bottle of beer (355 mL) one 5 oz glass of wine (148 mL), or one 1½ oz glass of hard liquor (44 mL).        General instructions    Take over-the-counter and prescription medicines only as told by your health care provider.  Do not drive or use heavy machinery while taking prescription pain medicine.  Return to your normal activities as told by your health care provider. Ask your health care provider what activities are safe for you.  Keep all follow-up visits as told by your health care provider. This is important.    Contact a health care provider if you have:  Another gout attack.  Continuing symptoms of a gout attack after 10 days of treatment.  Side effects from your medicines.  Chills or a fever.  Burning pain when you urinate.  Pain in your lower back or belly.    Get help right away if you:  Have severe or uncontrolled pain.  Cannot urinate.    Summary  Gout is painful swelling of the joints caused by inflammation.  The most common site of pain is the big toe, but it can affect other joints in the body.  Medicines and dietary changes can help to prevent and treat gout attacks.    ADDITIONAL NOTES AND INSTRUCTIONS    Please follow up with your Primary MD in 24-48 hr.  Seek immediate medical care for any new/worsening signs or symptoms.

## 2022-09-13 NOTE — ED PROVIDER NOTE - PATIENT PORTAL LINK FT
You can access the FollowMyHealth Patient Portal offered by [facility] by registering at the following website: [email or web address]. By joining [employer]’s FollowMyHealth portal, you will also be able to view your health information using other applications (apps) compatible with our system.

## 2022-09-13 NOTE — ED PROVIDER NOTE - OBJECTIVE STATEMENT
56 y/o M w/ no pertinent PMHx presents to ED c/o x1 day of left great toe pain. Pt reports no associated loss of function or paraesthesia. He denies recent trauma, known bug bites, fever and chills. Expresses he was taking indomethacin for last x2 days w/ out improvement. Pt states he drinks EtOH x2 times a week.  Denies smoking or red meat use. No FMHx of gout or personal history. No other current complaints at this time.

## 2023-01-25 ENCOUNTER — EMERGENCY (EMERGENCY)
Facility: HOSPITAL | Age: 58
LOS: 1 days | Discharge: HOME | End: 2023-01-25
Admitting: STUDENT IN AN ORGANIZED HEALTH CARE EDUCATION/TRAINING PROGRAM
Payer: MEDICAID

## 2023-01-25 VITALS
OXYGEN SATURATION: 100 % | DIASTOLIC BLOOD PRESSURE: 90 MMHG | HEART RATE: 62 BPM | TEMPERATURE: 98 F | RESPIRATION RATE: 18 BRPM | SYSTOLIC BLOOD PRESSURE: 143 MMHG

## 2023-01-25 VITALS
SYSTOLIC BLOOD PRESSURE: 137 MMHG | DIASTOLIC BLOOD PRESSURE: 78 MMHG | RESPIRATION RATE: 17 BRPM | OXYGEN SATURATION: 100 % | HEART RATE: 60 BPM | TEMPERATURE: 98 F

## 2023-01-25 LAB
A1C WITH ESTIMATED AVERAGE GLUCOSE RESULT: 5.6 % — SIGNIFICANT CHANGE UP (ref 4–5.6)
ALBUMIN SERPL ELPH-MCNC: 4.1 G/DL — SIGNIFICANT CHANGE UP (ref 3.3–5)
ALP SERPL-CCNC: 108 U/L — SIGNIFICANT CHANGE UP (ref 40–120)
ALT FLD-CCNC: 24 U/L — SIGNIFICANT CHANGE UP (ref 4–41)
ANION GAP SERPL CALC-SCNC: 10 MMOL/L — SIGNIFICANT CHANGE UP (ref 7–14)
APPEARANCE UR: CLEAR — SIGNIFICANT CHANGE UP
AST SERPL-CCNC: 27 U/L — SIGNIFICANT CHANGE UP (ref 4–40)
BACTERIA # UR AUTO: NEGATIVE — SIGNIFICANT CHANGE UP
BASE EXCESS BLDV CALC-SCNC: 4.5 MMOL/L — HIGH (ref -2–3)
BASOPHILS # BLD AUTO: 0.02 K/UL — SIGNIFICANT CHANGE UP (ref 0–0.2)
BASOPHILS NFR BLD AUTO: 0.4 % — SIGNIFICANT CHANGE UP (ref 0–2)
BILIRUB SERPL-MCNC: 0.8 MG/DL — SIGNIFICANT CHANGE UP (ref 0.2–1.2)
BILIRUB UR-MCNC: NEGATIVE — SIGNIFICANT CHANGE UP
BLOOD GAS VENOUS COMPREHENSIVE RESULT: SIGNIFICANT CHANGE UP
BUN SERPL-MCNC: 13 MG/DL — SIGNIFICANT CHANGE UP (ref 7–23)
CALCIUM SERPL-MCNC: 9.6 MG/DL — SIGNIFICANT CHANGE UP (ref 8.4–10.5)
CHLORIDE BLDV-SCNC: 102 MMOL/L — SIGNIFICANT CHANGE UP (ref 96–108)
CHLORIDE SERPL-SCNC: 102 MMOL/L — SIGNIFICANT CHANGE UP (ref 98–107)
CO2 BLDV-SCNC: 33 MMOL/L — HIGH (ref 22–26)
CO2 SERPL-SCNC: 24 MMOL/L — SIGNIFICANT CHANGE UP (ref 22–31)
COLOR SPEC: YELLOW — SIGNIFICANT CHANGE UP
CREAT SERPL-MCNC: 1.19 MG/DL — SIGNIFICANT CHANGE UP (ref 0.5–1.3)
DIFF PNL FLD: ABNORMAL
EGFR: 72 ML/MIN/1.73M2 — SIGNIFICANT CHANGE UP
EOSINOPHIL # BLD AUTO: 0.05 K/UL — SIGNIFICANT CHANGE UP (ref 0–0.5)
EOSINOPHIL NFR BLD AUTO: 1 % — SIGNIFICANT CHANGE UP (ref 0–6)
EPI CELLS # UR: 0 /HPF — SIGNIFICANT CHANGE UP (ref 0–5)
ESTIMATED AVERAGE GLUCOSE: 114 — SIGNIFICANT CHANGE UP
FLUAV AG NPH QL: SIGNIFICANT CHANGE UP
FLUBV AG NPH QL: SIGNIFICANT CHANGE UP
GAS PNL BLDV: 134 MMOL/L — LOW (ref 136–145)
GAS PNL BLDV: SIGNIFICANT CHANGE UP
GLUCOSE BLDV-MCNC: 88 MG/DL — SIGNIFICANT CHANGE UP (ref 70–99)
GLUCOSE SERPL-MCNC: 90 MG/DL — SIGNIFICANT CHANGE UP (ref 70–99)
GLUCOSE UR QL: NEGATIVE — SIGNIFICANT CHANGE UP
HCO3 BLDV-SCNC: 31 MMOL/L — HIGH (ref 22–29)
HCT VFR BLD CALC: 52.2 % — HIGH (ref 39–50)
HCT VFR BLDA CALC: 52 % — HIGH (ref 39–51)
HGB BLD CALC-MCNC: 17.2 G/DL — HIGH (ref 13–17)
HGB BLD-MCNC: 16.8 G/DL — SIGNIFICANT CHANGE UP (ref 13–17)
HYALINE CASTS # UR AUTO: 0 /LPF — SIGNIFICANT CHANGE UP (ref 0–7)
IANC: 2.96 K/UL — SIGNIFICANT CHANGE UP (ref 1.8–7.4)
IMM GRANULOCYTES NFR BLD AUTO: 0.2 % — SIGNIFICANT CHANGE UP (ref 0–0.9)
KETONES UR-MCNC: NEGATIVE — SIGNIFICANT CHANGE UP
LACTATE BLDV-MCNC: 1.2 MMOL/L — SIGNIFICANT CHANGE UP (ref 0.5–2)
LEUKOCYTE ESTERASE UR-ACNC: ABNORMAL
LYMPHOCYTES # BLD AUTO: 1.73 K/UL — SIGNIFICANT CHANGE UP (ref 1–3.3)
LYMPHOCYTES # BLD AUTO: 33.1 % — SIGNIFICANT CHANGE UP (ref 13–44)
MCHC RBC-ENTMCNC: 29.9 PG — SIGNIFICANT CHANGE UP (ref 27–34)
MCHC RBC-ENTMCNC: 32.2 GM/DL — SIGNIFICANT CHANGE UP (ref 32–36)
MCV RBC AUTO: 93 FL — SIGNIFICANT CHANGE UP (ref 80–100)
MONOCYTES # BLD AUTO: 0.45 K/UL — SIGNIFICANT CHANGE UP (ref 0–0.9)
MONOCYTES NFR BLD AUTO: 8.6 % — SIGNIFICANT CHANGE UP (ref 2–14)
NEUTROPHILS # BLD AUTO: 2.96 K/UL — SIGNIFICANT CHANGE UP (ref 1.8–7.4)
NEUTROPHILS NFR BLD AUTO: 56.7 % — SIGNIFICANT CHANGE UP (ref 43–77)
NITRITE UR-MCNC: NEGATIVE — SIGNIFICANT CHANGE UP
NRBC # BLD: 0 /100 WBCS — SIGNIFICANT CHANGE UP (ref 0–0)
NRBC # FLD: 0 K/UL — SIGNIFICANT CHANGE UP (ref 0–0)
PCO2 BLDV: 53 MMHG — SIGNIFICANT CHANGE UP (ref 42–55)
PH BLDV: 7.38 — SIGNIFICANT CHANGE UP (ref 7.32–7.43)
PH UR: 6 — SIGNIFICANT CHANGE UP (ref 5–8)
PLATELET # BLD AUTO: 161 K/UL — SIGNIFICANT CHANGE UP (ref 150–400)
PO2 BLDV: 30 MMHG — SIGNIFICANT CHANGE UP
POTASSIUM BLDV-SCNC: 5.1 MMOL/L — SIGNIFICANT CHANGE UP (ref 3.5–5.1)
POTASSIUM SERPL-MCNC: 5.2 MMOL/L — SIGNIFICANT CHANGE UP (ref 3.5–5.3)
POTASSIUM SERPL-SCNC: 5.2 MMOL/L — SIGNIFICANT CHANGE UP (ref 3.5–5.3)
PROT SERPL-MCNC: 7.2 G/DL — SIGNIFICANT CHANGE UP (ref 6–8.3)
PROT UR-MCNC: ABNORMAL
RBC # BLD: 5.61 M/UL — SIGNIFICANT CHANGE UP (ref 4.2–5.8)
RBC # FLD: 12.9 % — SIGNIFICANT CHANGE UP (ref 10.3–14.5)
RBC CASTS # UR COMP ASSIST: 17 /HPF — HIGH (ref 0–4)
RSV RNA NPH QL NAA+NON-PROBE: SIGNIFICANT CHANGE UP
SAO2 % BLDV: 43 % — SIGNIFICANT CHANGE UP
SARS-COV-2 RNA SPEC QL NAA+PROBE: SIGNIFICANT CHANGE UP
SODIUM SERPL-SCNC: 136 MMOL/L — SIGNIFICANT CHANGE UP (ref 135–145)
SP GR SPEC: 1.02 — SIGNIFICANT CHANGE UP (ref 1.01–1.05)
TROPONIN T, HIGH SENSITIVITY RESULT: 18 NG/L — SIGNIFICANT CHANGE UP
TROPONIN T, HIGH SENSITIVITY RESULT: 19 NG/L — SIGNIFICANT CHANGE UP
UROBILINOGEN FLD QL: SIGNIFICANT CHANGE UP
WBC # BLD: 5.22 K/UL — SIGNIFICANT CHANGE UP (ref 3.8–10.5)
WBC # FLD AUTO: 5.22 K/UL — SIGNIFICANT CHANGE UP (ref 3.8–10.5)
WBC UR QL: 3 /HPF — SIGNIFICANT CHANGE UP (ref 0–5)

## 2023-01-25 PROCEDURE — 99285 EMERGENCY DEPT VISIT HI MDM: CPT

## 2023-01-25 PROCEDURE — 71046 X-RAY EXAM CHEST 2 VIEWS: CPT | Mod: 26

## 2023-01-25 NOTE — ED PROVIDER NOTE - OBJECTIVE STATEMENT
38-year-old male no known past medical history presents to ED complaining of urinary frequency x5 days, intermittent low back pain x3 days and chest pain since this AM.  Patient states this AM while walking had sharp right-sided chest pain which was intermittent and patient decided to come to be evaluated given his other symptoms as well.  Patient states has not seen his doctor in some time. + Family history of diabetes Brother had complications and is  denies any SOB, diaphoresis, N/V/D, dysuria, hematuria, leg swelling. 58-year-old male no known past medical history presents to ED complaining of urinary frequency x5 days, intermittent low back pain x3 days and chest pain since this AM.  Patient states this AM while walking had sharp right-sided chest pain which was intermittent and patient decided to come to be evaluated given his other symptoms as well.  Patient states has not seen his doctor in some time. + Family history of diabetes Brother had complications and is .  currently patient has no pain.  denies any SOB, diaphoresis, N/V/D, dysuria, hematuria, leg swelling.

## 2023-01-25 NOTE — ED PROVIDER NOTE - PATIENT PORTAL LINK FT
You can access the FollowMyHealth Patient Portal offered by [facility] by registering at the following website: [email or web address]. By joining [employer]’s FollowMyHealth portal, you will also be able to view your health information using other applications (apps) compatible with our system.

## 2023-01-25 NOTE — ED ADULT NURSE NOTE - OBJECTIVE STATEMENT
58 yom presents A&Ox4, c mid sternal chest pain radiating to R side this morning. Pt also c/o L sided flank pain and increased urination x1 wk. Denies any PMHx. Respirations even and unlabored, abdomen non tender x4 quads. Pt denies any CP, sob, N/V/D. Last BM this morning. Labs sent per order. pt refused IV - PA made aware. No acute distress noted.

## 2023-01-25 NOTE — ED ADULT TRIAGE NOTE - CHIEF COMPLAINT QUOTE
Pt reporting to the ED midsternal chest pain radiating to R side starting this morning. Reports L lower back pain staring 3 days ago, no dysuria or hematuria. no pmh.

## 2023-01-25 NOTE — ED PROVIDER NOTE - NSFOLLOWUPINSTRUCTIONS_ED_ALL_ED_FT
Follow up with urologist within 1-2 weeks.  Follow up with PCP in 2 days.  Return to ED for any worsening chest pain, shortness of breath, fever, pain with urination.

## 2023-01-25 NOTE — ED PROVIDER NOTE - NSICDXFAMILYHX_GEN_ALL_CORE_FT
FAMILY HISTORY:  Sibling  Still living? No  FHx: type 2 diabetes mellitus, Age at diagnosis: Age Unknown

## 2023-01-25 NOTE — ED PROVIDER NOTE - CLINICAL SUMMARY MEDICAL DECISION MAKING FREE TEXT BOX
58-year-old male no known past medical history presents to ED complaining of urinary frequency x5 days, intermittent low back pain x3 days and chest pain since this AM.  Patient states this AM while walking had sharp right-sided chest pain which was intermittent and patient decided to come to be evaluated given his other symptoms as well.  Patient states has not seen his doctor in some time. + Family history of diabetes Brother had complications and is .  Currently patient has no pain. denies any SOB, diaphoresis, N/V/D, dysuria, hematuria, leg swelling.    Plan: labs/trop/cxr- EKG nonischemic, trop x 2 negative, cxr negative, UA revealed trace blood and rbcs, - urine culture and gc/chlam sent.  Will have patient follow up with urologist and PCP.

## 2023-01-26 LAB
C TRACH RRNA SPEC QL NAA+PROBE: SIGNIFICANT CHANGE UP
N GONORRHOEA RRNA SPEC QL NAA+PROBE: SIGNIFICANT CHANGE UP
SPECIMEN SOURCE: SIGNIFICANT CHANGE UP

## 2023-01-27 LAB
CULTURE RESULTS: SIGNIFICANT CHANGE UP
SPECIMEN SOURCE: SIGNIFICANT CHANGE UP

## 2023-04-20 NOTE — CONSULT NOTE ADULT - CONSULT REQUESTED DATE/TIME
14-May-2020 11:30
Healthcare maintenance

## 2023-07-19 NOTE — ED ADULT TRIAGE NOTE - BP NONINVASIVE DIASTOLIC (MM HG)
Health Maintenance Due   Topic Date Due   • Hepatitis B Vaccine (1 of 3 - 3-dose series) Never done   • COVID-19 Vaccine (4 - Pfizer series) 11/15/2021       Patient is due for topics as listed above but is not proceeding with Immunization(s) COVID-19 and Hep B at this time.    71

## 2024-06-10 ENCOUNTER — APPOINTMENT (OUTPATIENT)
Dept: PLASTIC SURGERY | Facility: CLINIC | Age: 59
End: 2024-06-10
Payer: MEDICAID

## 2024-06-10 DIAGNOSIS — T84.498A OTHER MECHANICAL COMPLICATION OF OTHER INTERNAL ORTHOPEDIC DEVICES, IMPLANTS AND GRAFTS, INITIAL ENCOUNTER: ICD-10-CM

## 2024-06-10 DIAGNOSIS — M27.2 INFLAMMATORY CONDITIONS OF JAWS: ICD-10-CM

## 2024-06-10 DIAGNOSIS — Z09 ENCOUNTER FOR FOLLOW-UP EXAMINATION AFTER COMPLETED TREATMENT FOR CONDITIONS OTHER THAN MALIGNANT NEOPLASM: ICD-10-CM

## 2024-06-10 DIAGNOSIS — S02.609G: ICD-10-CM

## 2024-06-10 PROCEDURE — 99203 OFFICE O/P NEW LOW 30 MIN: CPT

## 2024-06-11 PROBLEM — M27.2 ABSCESS OF MANDIBLE: Status: ACTIVE | Noted: 2020-07-01

## 2024-06-11 PROBLEM — S02.609G: Status: ACTIVE | Noted: 2020-07-22

## 2024-06-11 PROBLEM — Z09 POSTOPERATIVE EXAMINATION: Status: ACTIVE | Noted: 2020-06-03

## 2024-06-20 ENCOUNTER — EMERGENCY (EMERGENCY)
Facility: HOSPITAL | Age: 59
LOS: 1 days | Discharge: HOME | End: 2024-06-20
Attending: EMERGENCY MEDICINE | Admitting: EMERGENCY MEDICINE
Payer: MEDICAID

## 2024-06-20 VITALS
TEMPERATURE: 98 F | SYSTOLIC BLOOD PRESSURE: 126 MMHG | OXYGEN SATURATION: 100 % | HEART RATE: 84 BPM | RESPIRATION RATE: 18 BRPM | DIASTOLIC BLOOD PRESSURE: 80 MMHG | WEIGHT: 199.96 LBS

## 2024-06-20 PROCEDURE — 99285 EMERGENCY DEPT VISIT HI MDM: CPT

## 2024-06-20 NOTE — ED ADULT TRIAGE NOTE - CHIEF COMPLAINT QUOTE
Pt reporting to the ED for L arm "tingling" starting at 1 pm. No weakness, 0sensation intact, no limb drift noted. no pmh. speech clear, gait steady, no facial droop noted. MD Hamilton called fro stroke eval. Pt reporting to the ED for L arm "tingling" starting at 1 pm. No weakness, sensation intact bilaterally , no limb drift noted. no pmh. speech clear, gait steady, no facial droop noted. MD Hamilton called fro stroke eval. Pt reporting to the ED for L arm "tingling" starting at 1 pm. No weakness, sensation intact bilaterally , no limb drift noted. no pmh. speech clear, gait steady, no facial droop noted. MD Hamilton called fro stroke eval. no stroke called.

## 2024-06-21 VITALS
HEART RATE: 74 BPM | TEMPERATURE: 98 F | DIASTOLIC BLOOD PRESSURE: 80 MMHG | OXYGEN SATURATION: 100 % | SYSTOLIC BLOOD PRESSURE: 122 MMHG | RESPIRATION RATE: 16 BRPM

## 2024-06-21 LAB
ALBUMIN SERPL ELPH-MCNC: 3.8 G/DL — SIGNIFICANT CHANGE UP (ref 3.3–5)
ALP SERPL-CCNC: 101 U/L — SIGNIFICANT CHANGE UP (ref 40–120)
ALT FLD-CCNC: 25 U/L — SIGNIFICANT CHANGE UP (ref 4–41)
ANION GAP SERPL CALC-SCNC: 9 MMOL/L — SIGNIFICANT CHANGE UP (ref 7–14)
APTT BLD: 27.5 SEC — SIGNIFICANT CHANGE UP (ref 24.5–35.6)
AST SERPL-CCNC: 28 U/L — SIGNIFICANT CHANGE UP (ref 4–40)
BASOPHILS # BLD AUTO: 0.03 K/UL — SIGNIFICANT CHANGE UP (ref 0–0.2)
BASOPHILS NFR BLD AUTO: 0.6 % — SIGNIFICANT CHANGE UP (ref 0–2)
BILIRUB SERPL-MCNC: 0.7 MG/DL — SIGNIFICANT CHANGE UP (ref 0.2–1.2)
BUN SERPL-MCNC: 13 MG/DL — SIGNIFICANT CHANGE UP (ref 7–23)
CALCIUM SERPL-MCNC: 9 MG/DL — SIGNIFICANT CHANGE UP (ref 8.4–10.5)
CHLORIDE SERPL-SCNC: 106 MMOL/L — SIGNIFICANT CHANGE UP (ref 98–107)
CO2 SERPL-SCNC: 25 MMOL/L — SIGNIFICANT CHANGE UP (ref 22–31)
CREAT SERPL-MCNC: 1.17 MG/DL — SIGNIFICANT CHANGE UP (ref 0.5–1.3)
EGFR: 73 ML/MIN/1.73M2 — SIGNIFICANT CHANGE UP
EOSINOPHIL # BLD AUTO: 0.1 K/UL — SIGNIFICANT CHANGE UP (ref 0–0.5)
EOSINOPHIL NFR BLD AUTO: 2 % — SIGNIFICANT CHANGE UP (ref 0–6)
GLUCOSE SERPL-MCNC: 96 MG/DL — SIGNIFICANT CHANGE UP (ref 70–99)
HCT VFR BLD CALC: 43.1 % — SIGNIFICANT CHANGE UP (ref 39–50)
HGB BLD-MCNC: 14.8 G/DL — SIGNIFICANT CHANGE UP (ref 13–17)
IANC: 2.52 K/UL — SIGNIFICANT CHANGE UP (ref 1.8–7.4)
IMM GRANULOCYTES NFR BLD AUTO: 0.2 % — SIGNIFICANT CHANGE UP (ref 0–0.9)
INR BLD: 1.02 RATIO — SIGNIFICANT CHANGE UP (ref 0.85–1.18)
LYMPHOCYTES # BLD AUTO: 1.95 K/UL — SIGNIFICANT CHANGE UP (ref 1–3.3)
LYMPHOCYTES # BLD AUTO: 38.2 % — SIGNIFICANT CHANGE UP (ref 13–44)
MAGNESIUM SERPL-MCNC: 1.9 MG/DL — SIGNIFICANT CHANGE UP (ref 1.6–2.6)
MCHC RBC-ENTMCNC: 30.9 PG — SIGNIFICANT CHANGE UP (ref 27–34)
MCHC RBC-ENTMCNC: 34.3 GM/DL — SIGNIFICANT CHANGE UP (ref 32–36)
MCV RBC AUTO: 90 FL — SIGNIFICANT CHANGE UP (ref 80–100)
MONOCYTES # BLD AUTO: 0.5 K/UL — SIGNIFICANT CHANGE UP (ref 0–0.9)
MONOCYTES NFR BLD AUTO: 9.8 % — SIGNIFICANT CHANGE UP (ref 2–14)
NEUTROPHILS # BLD AUTO: 2.52 K/UL — SIGNIFICANT CHANGE UP (ref 1.8–7.4)
NEUTROPHILS NFR BLD AUTO: 49.2 % — SIGNIFICANT CHANGE UP (ref 43–77)
NRBC # BLD: 0 /100 WBCS — SIGNIFICANT CHANGE UP (ref 0–0)
NRBC # FLD: 0 K/UL — SIGNIFICANT CHANGE UP (ref 0–0)
PHOSPHATE SERPL-MCNC: 2.8 MG/DL — SIGNIFICANT CHANGE UP (ref 2.5–4.5)
PLATELET # BLD AUTO: 146 K/UL — LOW (ref 150–400)
POTASSIUM SERPL-MCNC: 4.1 MMOL/L — SIGNIFICANT CHANGE UP (ref 3.5–5.3)
POTASSIUM SERPL-SCNC: 4.1 MMOL/L — SIGNIFICANT CHANGE UP (ref 3.5–5.3)
PROT SERPL-MCNC: 6.6 G/DL — SIGNIFICANT CHANGE UP (ref 6–8.3)
PROTHROM AB SERPL-ACNC: 11.5 SEC — SIGNIFICANT CHANGE UP (ref 9.5–13)
RBC # BLD: 4.79 M/UL — SIGNIFICANT CHANGE UP (ref 4.2–5.8)
RBC # FLD: 13.2 % — SIGNIFICANT CHANGE UP (ref 10.3–14.5)
SODIUM SERPL-SCNC: 140 MMOL/L — SIGNIFICANT CHANGE UP (ref 135–145)
TROPONIN T, HIGH SENSITIVITY RESULT: 17 NG/L — SIGNIFICANT CHANGE UP
WBC # BLD: 5.11 K/UL — SIGNIFICANT CHANGE UP (ref 3.8–10.5)
WBC # FLD AUTO: 5.11 K/UL — SIGNIFICANT CHANGE UP (ref 3.8–10.5)

## 2024-06-21 PROCEDURE — 93010 ELECTROCARDIOGRAM REPORT: CPT

## 2024-06-21 PROCEDURE — 70496 CT ANGIOGRAPHY HEAD: CPT | Mod: 26,MC

## 2024-06-21 PROCEDURE — 70498 CT ANGIOGRAPHY NECK: CPT | Mod: 26,MC

## 2024-06-21 RX ORDER — KETOROLAC TROMETHAMINE 30 MG/ML
15 SYRINGE (ML) INJECTION ONCE
Refills: 0 | Status: DISCONTINUED | OUTPATIENT
Start: 2024-06-21 | End: 2024-06-21

## 2024-06-21 NOTE — ED ADULT NURSE NOTE - CHIEF COMPLAINT QUOTE
Pt reporting to the ED for L arm "tingling" starting at 1 pm. No weakness, sensation intact bilaterally , no limb drift noted. no pmh. speech clear, gait steady, no facial droop noted. MD Hamilton called fro stroke eval. no stroke called.

## 2024-06-21 NOTE — ED ADULT NURSE REASSESSMENT NOTE - NS ED NURSE REASSESS COMMENT FT1
Pt resting in stretcher, at baseline orientation status, ambulatory with steady gait, IV removed, safety maintained, DC at this time.

## 2024-06-21 NOTE — ED PROVIDER NOTE - ATTENDING CONTRIBUTION TO CARE
Dr. Valero:  I have personally performed a face to face bedside history and physical examination of this patient. I have discussed the history, examination, review of systems, assessment and plan of management with the resident. I have reviewed the electronic medical record and amended it to reflect my history, review of systems, physical exam, assessment and plan.      59M denies pmh presents with LUE "tingling" sensation x 5 days.  Describes sensation as feeling pins & needles.  Denies trauma, denies fever/chills, cp, sob, n/v/d, neck pain, weakness.    Exam:  - nad  - rrr  - ctab  - abd soft ntnd  - sensation to light touch equal bilaterally face & extremities, strength 5/5 bilateral extremities, normal gait    A/P  - LUE paresthesia, likely peripheral radiculopathy, eval CVA, r/o ACS  - cbc, cmp, trop, CTA head/neck, CT head, EKG

## 2024-06-21 NOTE — CONSULT NOTE ADULT - ASSESSMENT
59 years old man previously healthy with no known past medical condition presenting for left arm tingling that has been present since last Monday started around the evening and has been present since then. Patient endorses tingling only. There is no numbness or pain. He denies any weakness. The tingling affects only his left shoulder lateral and medial and spreads down to the lateral forearm. Exam is normal.    impression: left arm tingling likely radicular vs peripheral entrapment could not rule out central    plan:  [] CDU  [] MRI brain without contrast  [] MRI cervical spine without contrast  [] MRI thoracic spine without contrast    case to be evaluated in the AM with the general attending.

## 2024-06-21 NOTE — CONSULT NOTE ADULT - SUBJECTIVE AND OBJECTIVE BOX
Neurology - Consult Note    -  Spectra: [id number] ([facility]), [phone] ([facility])  -    HPI: Patient SPENCER DEJESUS is a (1965)  59 years old man previously healthy with no known past medical condition presenting for left arm tingling that has been present since last Monday started around the evening and has been present since then. Patient endorses tingling only. There is no numbness or pain. He denies any weakness. The tingling affects only his left shoulder lateral and medial and spreads down to the lateral forearm. It does not affect the hand. He noticed that lying back this would resolve the symptoms but standing up or sitting straight would worsen his sensation and he might thus feel the tingling at a level of 9/10 in intensity. He denies any previous episode. He expresses that the tingling does not spread to affect the neck, face or leg. He denies any neck pain. He denies any weakness, speech disturbance, dizziness or blurry vision. There was no recent infection, fall or trauma. He has not been involved in a significantly demanding physical activity. He only can recall that he sleeps on his arm at night.     Review of Systems:    NEUROLOGICAL: +As stated in HPI above  All other review of systems is negative unless indicated above.    Allergies:  No Known Allergies    PMHx/PSHx/Family Hx: As above, otherwise see below   No pertinent past medical history    Social Hx:  No current use of tobacco, alcohol, or illicit drugs      Medications:  MEDICATIONS  (STANDING):    MEDICATIONS  (PRN):      Vitals:  T(C): 36.7 (06-20-24 @ 23:55), Max: 36.7 (06-20-24 @ 23:55)  HR: 84 (06-20-24 @ 23:55) (84 - 84)  BP: 126/80 (06-20-24 @ 23:55) (126/80 - 126/80)  RR: 18 (06-20-24 @ 23:55) (18 - 18)  SpO2: 100% (06-20-24 @ 23:55) (100% - 100%)    Physical Examination:   General - NAD    Neurologic Exam:  Mental status - Awake, Alert, Oriented to person, place, and time. Speech fluent, repetition and naming intact. Follows simple and complex commands. Attention/concentration, recent and remote memory (including registration and recall), and fund of knowledge intact    Cranial nerves - PERRLA, VFF, EOMI, face sensation (V1-V3) intact b/l, facial strength intact without asymmetry b/l, hearing intact b/l, palate with symmetric elevation,  sternocleidomastoid 5/5 strength b/l, tongue midline on protrusion with full lateral movement    Motor - Normal bulk and tone throughout. No pronator drift.  Strength testing            Deltoid      Biceps      Triceps                R            5                 5               5                     5                               L             5                 5               5                     5                              pronation/supination. wrist flex/exten. fingers abduction/adduction, shoulder abduction adduction all 5/5 in the left              Hip Flexion    Hip Extension    Knee Flexion    Knee Extension    Dorsiflexion    Plantar Flexion  R              5                           5                       5                           5                            5                          5  L              5                           5                        5                           5                            5                          5    Sensation - Light touch and pinprick intact throughout    DTR's -             Biceps      Triceps     Brachioradialis           Patellar    Ankle    Toes/plantar response  R             2+             2+                  2+                       2+            2+                 Down  L              2+             2+                 2+                        2+           2+                 Down    Coordination - Finger to Nose intact b/l. No tremors appreciated. HTS intact bilateral    Gait and station - Normal casual gait. Romberg (-)    Labs:                        14.8   5.11  )-----------( 146      ( 21 Jun 2024 01:24 )             43.1     06-21    140  |  106  |  13  ----------------------------<  96  4.1   |  25  |  1.17    Ca    9.0      21 Jun 2024 01:24  Phos  2.8     06-21  Mg     1.90     06-21    TPro  6.6  /  Alb  3.8  /  TBili  0.7  /  DBili  x   /  AST  28  /  ALT  25  /  AlkPhos  101  06-21    CAPILLARY BLOOD GLUCOSE      POCT Blood Glucose.: 110 mg/dL (20 Jun 2024 23:58)    LIVER FUNCTIONS - ( 21 Jun 2024 01:24 )  Alb: 3.8 g/dL / Pro: 6.6 g/dL / ALK PHOS: 101 U/L / ALT: 25 U/L / AST: 28 U/L / GGT: x             PT/INR - ( 21 Jun 2024 01:24 )   PT: 11.5 sec;   INR: 1.02 ratio         PTT - ( 21 Jun 2024 01:24 )  PTT:27.5 sec  CSF:                  Radiology:

## 2024-06-21 NOTE — ED PROVIDER NOTE - PHYSICAL EXAMINATION
GENERAL: no acute distress, non-toxic appearing  HEAD: normocephalic, atraumatic  HEENT: normal conjunctiva, oral mucosa moist, neck supple  CARDIAC: regular rate and rhythm, normal S1 and S2,  no appreciable murmurs  PULM: clear to ascultation bilaterally, no crackles, rales, rhonchi, or wheezing  NEURO: alert and oriented x 3, normal speech, EOMI, no focal motor or sensory deficits, nonantalgic gait  MSK: no midline cervical spine ttp, no visible deformities, no peripheral edema, calf tenderness/redness/swelling  SKIN: no visible rashes, dry, well-perfused  PSYCH: appropriate mood and affect

## 2024-06-21 NOTE — ED PROVIDER NOTE - CLINICAL SUMMARY MEDICAL DECISION MAKING FREE TEXT BOX
58 yo m no pmhx with L arm paresthesias - neuro intact - vss, central vs peripheral neuropathy. will get cta, neuro cs.

## 2024-06-21 NOTE — ED ADULT NURSE NOTE - OBJECTIVE STATEMENT
break coverage rn. pt received to spot 3a. A&Ox4, ambulatory at [facility]. denies medical history. pt c/o Since Monday evening L arm tingling sensation beginning at 1pm. did not take medications for symptoms, no aggravating/relieving factors. pt here for eval due to consistent symptoms. PERRLA, facial features symmetric, 5/5 upper and lower extremity strength noted 20G to L forearm placed, labs sent, awaiting CT

## 2024-06-21 NOTE — ED PROVIDER NOTE - OBJECTIVE STATEMENT
Follow-up in 6 months   60 yo M no PMHx pw L arm tingling that started Monday pw 58 yo M no PMHx pw L arm tingling that started Monday atraumatic, no symptoms in leg, face. No weakness, no cp, sob. No heavy lifting, neck pain. States he does not feel symptoms lying down.

## 2024-06-21 NOTE — CONSULT NOTE ADULT - NSCONSULTADDITIONALINFOA_GEN_ALL_CORE
Last night I was on-call attending and resident discussed the case with me over the phone around 4:00 am. I strongly advise, patient should benefit from further neuroimaging due to the persistent of symptoms.  But patient was discharged from emergency department. Thank you.  Albaro Alexandra MD

## 2024-06-21 NOTE — ED PROVIDER NOTE - PATIENT PORTAL LINK FT
You can access the FollowMyHealth Patient Portal offered by [facility] by registering at the following website: [email or web address]. By joining [facility]’s FollowMyHealth portal, you will also be able to view your health information using other applications (apps) compatible with our system.

## 2024-06-21 NOTE — ED PROVIDER NOTE - NSFOLLOWUPINSTRUCTIONS_ED_ALL_ED_FT
You were seen for left arm tingling. Your work up today showed no emergencies however we would recommend getting MRIs. Follow up with neurology at the number above tomorrow to set up an appointment. Return to the ER for numbness, tingling, weakness, speech, vision or walking changes or chest pain.

## 2024-06-21 NOTE — ED PROVIDER NOTE - PROGRESS NOTE DETAILS
No acute findings on CT - as per neuro would like CDU admission however he would not like to stay for MRI. Explained risks and benefits, pt would like to follow up outpatient.    Cynthia Arana MD, PGY3

## 2024-06-21 NOTE — ED ADULT NURSE NOTE - NS PRO PASSIVE SMOKE EXP
"Subjective:      Patient ID: Katy Soto is a 86 y.o. female.    Chief Complaint: Follow-up (3 month)      Vitals:    11/29/23 1421   BP: (!) 122/58   Pulse: 90   Temp: 97.7 °F (36.5 °C)   SpO2: 98%   Weight: 92.9 kg (204 lb 12.9 oz)   Height: 5' 4" (1.626 m)        HPI   Check up; here with niece; c/o left lower lateral leg  No back pain, no c/o with BM or urinations  Has been to card, pod and oph  Had labs,   Problem List  Patient Active Problem List   Diagnosis    Hyperlipidemia    Arthritis    PAD (peripheral artery disease)    Bilateral leg edema    Anxiety    Obesity, diabetes, and hypertension syndrome    Osteopenia    Atherosclerosis of aorta    Elevated sed rate    Vitamin D insufficiency    Severe obesity (BMI 35.0-39.9) with comorbidity    Reduced visual acuity    History of cerebral infarction    Generalized edema    Type 2 diabetes mellitus with stage 3a chronic kidney disease and hypertension    Type 2 diabetes mellitus with hyperlipidemia    Diabetic polyneuropathy associated with type 2 diabetes mellitus    Dementia, unspecified dementia severity, unspecified dementia type, unspecified whether behavioral, psychotic, or mood disturbance or anxiety    Primary hypertension        ALLERGIES:   Review of patient's allergies indicates:   Allergen Reactions    Cilostazol      [id number] and stomach cramps    Clonidine     Coreg [carvedilol]      Cramps stomach and nausea    Diltiazem      nauseana d stoamch cramps    Lexapro [escitalopram oxalate]      Dizzy, nausea and weak    Shellfish containing products Hives       MEDS:   Current Outpatient Medications:     aspirin (ECOTRIN) 81 MG EC tablet, Take 81 mg by mouth once daily., Disp: , Rfl:     atorvastatin (LIPITOR) 40 MG tablet, TAKE 1 TABLET(40 MG) BY MOUTH EVERY DAY, Disp: 90 tablet, Rfl: 3    blood sugar diagnostic Strp, by Misc.(Non-Drug; Combo Route) route., Disp: , Rfl:     blood-glucose meter kit, Use daily, Disp: 1 each, Rfl: 0    " cholecalciferol, vitamin D3, (VITAMIN D3) 125 mcg (5,000 unit) Tab, Take 1 tablet (5,000 Units total) by mouth once daily., Disp: 90 tablet, Rfl: 3    clopidogreL (PLAVIX) 75 mg tablet, TAKE 1 TABLET(75 MG) BY MOUTH EVERY DAY FOR 21 DAYS, Disp: 90 tablet, Rfl: 3    cyanocobalamin, vitamin B-12, 1,000 mcg Lozg, Place 1 lozenge under the tongue Daily. For B12 and nerves, Disp: 100 lozenge, Rfl: 3    donepeziL (ARICEPT) 5 MG tablet, TAKE 1 TABLET(5 MG) BY MOUTH EVERY EVENING FOR BLOOD PRESSURE, Disp: 90 tablet, Rfl: 3    folic acid/multivit-min/lutein (CENTRUM SILVER ORAL), Take by mouth., Disp: , Rfl:     furosemide (LASIX) 40 MG tablet, Take 2 tablets (80 mg total) by mouth 2 (two) times a day. For one week, then 1 bid, Disp: 360 tablet, Rfl: 3    irbesartan (AVAPRO) 300 MG tablet, TAKE 1 TABLET(300 MG) BY MOUTH EVERY DAY, Disp: 90 tablet, Rfl: 3    isosorbide mononitrate (IMDUR) 30 MG 24 hr tablet, Take 1 tablet (30 mg total) by mouth once daily., Disp: 90 tablet, Rfl: 3    lancets 31 gauge Misc, by Misc.(Non-Drug; Combo Route) route., Disp: , Rfl:     levothyroxine (SYNTHROID) 25 MCG tablet, Take 1 tablet (25 mcg total) by mouth before breakfast., Disp: 90 tablet, Rfl: 3    memantine (NAMENDA) 5 MG Tab, TAKE 1 TABLET(5 MG) BY MOUTH TWICE DAILY FOR MEMORY, Disp: 180 tablet, Rfl: 3    potassium chloride SA (K-DUR,KLOR-CON) 20 MEQ tablet, Take 1 tablet (20 mEq total) by mouth once daily., Disp: 90 tablet, Rfl: 3    sucralfate (CARAFATE) 1 gram tablet, TAKE 1 TABLET(1 GRAM) BY MOUTH THREE TIMES DAILY, Disp: 270 tablet, Rfl: 3    terazosin (HYTRIN) 5 MG capsule, Take 1 capsule (5 mg total) by mouth every evening., Disp: 90 capsule, Rfl: 3      History:  Current Providers as of 11/29/2023  PCP: Rudy Cruz MD  Care Team Provider: New Mehta MD  Care Team Provider: David Fried MD  Care Team Provider: Pinky Grajeda RD  Care Team Provider: Stevo Byers, DO  Encounter Provider: Rudy Cruz MD,  starting on  12:00 AM  Referring Provider: not found, starting on  12:00 AM  Consulting Physician: Rudy Cruz MD, starting on   2:17 PM (Active)   Past Medical History:   Diagnosis Date    Hyperlipidemia     Hypertension     right Occipital stroke 2022    TIA (transient ischemic attack) 2022    Type 2 diabetes mellitus with stage 3a chronic kidney disease and hypertension 2023    Type 2 diabetes mellitus without complication, without long-term current use of insulin 3/17/2022     Past Surgical History:   Procedure Laterality Date     SECTION      x1    HERNIA REPAIR      umbilical    LEG SURGERY Right 2017    stents placed     Social History     Tobacco Use    Smoking status: Never     Passive exposure: Never    Smokeless tobacco: Never   Substance Use Topics    Alcohol use: No    Drug use: No         Review of Systems   Constitutional: Negative.    HENT:  Positive for hearing loss.    Respiratory: Negative.     Cardiovascular: Negative.    Gastrointestinal: Negative.    Endocrine: Negative.    Genitourinary: Negative.    Musculoskeletal: Negative.    Psychiatric/Behavioral: Negative.     All other systems reviewed and are negative.    Objective:     Physical Exam  Vitals and nursing note reviewed.   Constitutional:       Appearance: She is well-developed. She is obese.   HENT:      Head: Normocephalic.   Eyes:      Conjunctiva/sclera: Conjunctivae normal.      Pupils: Pupils are equal, round, and reactive to light.   Cardiovascular:      Rate and Rhythm: Normal rate and regular rhythm.      Heart sounds: Normal heart sounds.   Pulmonary:      Effort: Pulmonary effort is normal.      Breath sounds: Normal breath sounds.   Musculoskeletal:         General: Normal range of motion.      Cervical back: Normal range of motion and neck supple.   Skin:     General: Skin is warm and dry.   Neurological:      General: No focal deficit present.       Mental Status: She is alert and oriented to person, place, and time. Mental status is at baseline.      Deep Tendon Reflexes: Reflexes are normal and symmetric.   Psychiatric:         Mood and Affect: Mood normal.         Behavior: Behavior normal.         Thought Content: Thought content normal.         Judgment: Judgment normal.             Assessment:     1. Type 2 diabetes mellitus with hyperlipidemia    2. Hypothyroidism due to acquired atrophy of thyroid    3. History of cerebral infarction    4. Atherosclerosis of aorta    5. PAD (peripheral artery disease)    6. Obesity, diabetes, and hypertension syndrome    7. Dementia, unspecified dementia severity, unspecified dementia type, unspecified whether behavioral, psychotic, or mood disturbance or anxiety      Plan:        Medication List            Accurate as of November 29, 2023 11:59 PM. If you have any questions, ask your nurse or doctor.                CONTINUE taking these medications      aspirin 81 MG EC tablet  Commonly known as: ECOTRIN     atorvastatin 40 MG tablet  Commonly known as: LIPITOR  TAKE 1 TABLET(40 MG) BY MOUTH EVERY DAY     blood sugar diagnostic Strp     blood-glucose meter kit  Use daily     CENTRUM SILVER ORAL     cholecalciferol (vitamin D3) 125 mcg (5,000 unit) Tab  Commonly known as: VITAMIN D3  Take 1 tablet (5,000 Units total) by mouth once daily.     clopidogreL 75 mg tablet  Commonly known as: PLAVIX  TAKE 1 TABLET(75 MG) BY MOUTH EVERY DAY FOR 21 DAYS     cyanocobalamin (vitamin B-12) 1,000 mcg Lozg  Place 1 lozenge under the tongue Daily. For B12 and nerves     donepeziL 5 MG tablet  Commonly known as: ARICEPT  TAKE 1 TABLET(5 MG) BY MOUTH EVERY EVENING FOR BLOOD PRESSURE     furosemide 40 MG tablet  Commonly known as: LASIX  Take 2 tablets (80 mg total) by mouth 2 (two) times a day. For one week, then 1 bid     irbesartan 300 MG tablet  Commonly known as: AVAPRO  TAKE 1 TABLET(300 MG) BY MOUTH EVERY DAY     isosorbide  mononitrate 30 MG 24 hr tablet  Commonly known as: IMDUR  Take 1 tablet (30 mg total) by mouth once daily.     lancets 31 gauge Misc     levothyroxine 25 MCG tablet  Commonly known as: SYNTHROID  Take 1 tablet (25 mcg total) by mouth before breakfast.     memantine 5 MG Tab  Commonly known as: NAMENDA  TAKE 1 TABLET(5 MG) BY MOUTH TWICE DAILY FOR MEMORY     potassium chloride SA 20 MEQ tablet  Commonly known as: K-DUR,KLOR-CON  Take 1 tablet (20 mEq total) by mouth once daily.     sucralfate 1 gram tablet  Commonly known as: CARAFATE  TAKE 1 TABLET(1 GRAM) BY MOUTH THREE TIMES DAILY     terazosin 5 MG capsule  Commonly known as: HYTRIN  Take 1 capsule (5 mg total) by mouth every evening.            Type 2 diabetes mellitus with hyperlipidemia  -     Hemoglobin A1C; Future; Expected date: 03/04/2024    Hypothyroidism due to acquired atrophy of thyroid  -     TSH; Future    History of cerebral infarction    Atherosclerosis of aorta    PAD (peripheral artery disease)    Obesity, diabetes, and hypertension syndrome    Dementia, unspecified dementia severity, unspecified dementia type, unspecified whether behavioral, psychotic, or mood disturbance or anxiety    Other orders  -     Influenza (FLUAD) - Quadrivalent (Adjuvanted) *Preferred* (65+) (PF)           No

## 2024-06-24 PROBLEM — T84.498A EXPOSED ORTHOPAEDIC HARDWARE: Status: ACTIVE | Noted: 2024-06-24

## 2024-06-24 NOTE — REASON FOR VISIT
[FreeTextEntry1] : DOS: 05/15/2020 s/p ORIF of Bilateral mandibular comminuted fracture with left angle fracture, right body fracture; Extraction of left mandibular third molar; repair of left orbital floor and medial wall blowout fracture with implant and bone graft to correct enophthalmos.

## 2024-06-24 NOTE — DISCUSSION/SUMMARY
[FreeTextEntry1] : SPENCER is a 59 year old male patient that presents to the office today for a post operative evaluation s/p removal of left mandibular angle plate and screws, extraction of left mandibular 2nd molar, debridement of left mandibular osteomylitic bone on July 27, 2020. Pt states that the hardware has been exposed in his mouth for about 2 months.  ROS: General health / Constitutional no fever, no chills, no unusual weight changes, no energy level changes, no night sweats Skin no pruritis, no rashes, no ulcers,  Hair No changes in color, texture, distribution, loss Nails No color changes, brittleness, infection Head No headaches, no new jaw pain Eyes Good visual acuity, no color blindness, no corrective lenses, no photophobia, no diplopia, no blurred vision, no infection, pain, no medications,  Ear no tinnitus, no discharge, no pain, no medications Nose no epistaxis, no rhinorrhea, no rhinitis, no pain,  Mouth & Throat no gingivitis, no gingival bleeding, no ulcers, no voice changes, no changes in oral mucosa or tongue Neck no stiffness, no pain, no lymphadenitis, no thyroid disorders,  Respiratory no cough, no dyspnea, no wheezing, no chest pain, cyanosis, no pneumonia, no asthma,  Cardiovascular no chest pain, no palpitations, no irregular rhythm, no tachycardia, no bradycardia, no heart failure, no dyspnea on exertion (DIAS), no edema Gastrointestinal no nausea / vomiting, no dysphagia, no reflux / GERD, no abdominal pain, no jaundice Musculoskeletal no pain in muscles, bones, or joints; no fractures, no dislocations, no muscular weakness, no atrophy Neurological no paresis, no paralysis, no paresthesia, no seizures, no dizziness, no syncope, no ataxia, no tremor Psychological no childhood behavioral problems, no irritability, no sleep changes Hematological no anemia, no bruising, no bleeding tendencies,   PHYSICAL EXAM General WDWN, in no distress, A & O x 3 (person, place, time)  HEENT Head: AT/NC (atraumatic, normocephalic), including TMJ, sensory and motor;  Eyes: EOMI, PERRLA Ears: exterior, nl hearing, Nose: no deformity septum Throat: +exposed hardware at right gingivobuccal sulcus. Neck: no masses, nl pulses  We had a 25 minute meeting with the patient discussing diagnosis and medical management issues and outcomes.  Plan: Remove hardware and screws from fracture repair

## 2024-08-19 ENCOUNTER — APPOINTMENT (OUTPATIENT)
Dept: PLASTIC SURGERY | Facility: CLINIC | Age: 59
End: 2024-08-19

## 2024-08-19 PROCEDURE — 20680 REMOVAL OF IMPLANT DEEP: CPT

## 2024-11-05 ENCOUNTER — EMERGENCY (EMERGENCY)
Facility: HOSPITAL | Age: 59
LOS: 1 days | Discharge: HOME | End: 2024-11-05
Attending: STUDENT IN AN ORGANIZED HEALTH CARE EDUCATION/TRAINING PROGRAM

## 2024-11-05 VITALS
RESPIRATION RATE: 18 BRPM | SYSTOLIC BLOOD PRESSURE: 147 MMHG | DIASTOLIC BLOOD PRESSURE: 84 MMHG | TEMPERATURE: 98 F | WEIGHT: 190.04 LBS | OXYGEN SATURATION: 98 % | HEIGHT: 68 IN | HEART RATE: 64 BPM

## 2024-11-05 PROCEDURE — 99282 EMERGENCY DEPT VISIT SF MDM: CPT

## 2024-11-05 PROCEDURE — 99284 EMERGENCY DEPT VISIT MOD MDM: CPT

## 2024-11-05 RX ORDER — IBUPROFEN 200 MG
600 TABLET ORAL ONCE
Refills: 0 | Status: DISCONTINUED | OUTPATIENT
Start: 2024-11-05 | End: 2024-11-05

## 2024-11-05 RX ORDER — LIDOCAINE HYDROCHLORIDE 40 MG/ML
1 SOLUTION TOPICAL ONCE
Refills: 0 | Status: DISCONTINUED | OUTPATIENT
Start: 2024-11-05 | End: 2024-11-05

## 2024-11-05 RX ORDER — ACETAMINOPHEN 500 MG
650 TABLET ORAL ONCE
Refills: 0 | Status: DISCONTINUED | OUTPATIENT
Start: 2024-11-05 | End: 2024-11-05

## 2024-11-05 NOTE — ED PROVIDER NOTE - NSFOLLOWUPINSTRUCTIONS_ED_ALL_ED_FT
Follow up with your Primary Care Physician within the next 2-3 days    TAKE TYLENOL 1000MG EVERY 6 HOURS AS NEEDED FOR PAIN  TAKE NSAIDS SUCH AS MOTRIN ALEVE ADVIL 400MG EVERY 8 HOURS WITH FOOD  APPLY OVER THE COUNTER LIDOCAINE PATCH AS DIRECTED    FOR CONTINUED PAIN FOLLOW UP WITH [facility]   [phone]    Return to the Emergency Room if you experience new or worsening symptoms numbness, weakness, urinary or fecal incontinence, worsening pain

## 2024-11-05 NOTE — ED PROVIDER NOTE - CLINICAL SUMMARY MEDICAL DECISION MAKING FREE TEXT BOX
Attending Anton: 60 y/o M who denies PMH presenting w/ back pain. Reports for the past 2+ months has been having L sided back pain. Denies and falls or trauma to the back. Reports pain worse w/ moving and improves w/ laying still. Has not taken any pain medicine since onset of pain. Has not been evaluated for this pain prior to today. No associated symptoms. No change in bowel or bladder control. No numbness. Denies fevers, chills, headache, dizziness, blurred vision, chest pain, cough, shortness of breath, abdominal pain, n/v/d/c, urinary symptoms, rash. Pt overall well appearing, no acute distress. Lungs clear. HR regular. Abd nondistended/soft/nontender. No midline spinal tenderness. L paralumbar tenderness. Str 5/5 x4. No appreciable sensory deficits x4. Suspect likely MSK pain such as strain vs. sprain vs. spasm. Do not suspect spinal cord/column pathology. No role for imaging at this time. Recommended OTC meds and rest. Kody WALTON.

## 2024-11-05 NOTE — ED PROVIDER NOTE - PATIENT PORTAL LINK FT
You can access the FollowMyHealth Patient Portal offered by [facility] by registering at the following website: [email or web address]. By joining [facility]’s FollowMyHealth portal, you will also be able to view your health information using other applications (apps) compatible with our system.

## 2024-11-05 NOTE — ED ADULT NURSE NOTE - NSFALLUNIVINTERV_ED_ALL_ED
Bed/Stretcher in lowest position, wheels locked, appropriate side rails in place/Call bell, personal items and telephone in reach/Instruct patient to call for assistance before getting out of bed/chair/stretcher/Non-slip footwear applied when patient is off stretcher/[place] to call system/Physically safe environment - no spills, clutter or unnecessary equipment/Purposeful proactive rounding/Room/bathroom lighting operational, light cord in reach

## 2024-11-05 NOTE — ED PROVIDER NOTE - ATTENDING APP SHARED VISIT CONTRIBUTION OF CARE
Attending Anton: I performed a face to face evaluation of the patient and obtained a history as well as performed a physical exam. I have discussed their management with the GREGG. I have reviewed the GREGG note and agree with the documented findings and plan of care, except as noted. This was a shared visit with an GREGG. I reviewed and verified the documentation and independently performed my own history/exam/medical decision making. My medical decision making and observations are found above. Please refer to any progress notes for updates on clinical course. My notes supersedes the above GREGG note in case of discrepancy

## 2024-11-05 NOTE — ED PROVIDER NOTE - OBJECTIVE STATEMENT
60 y/o male with no PMhx now presenting to the ED with atraumatic left sided back pain x2.5 months. Patient reports pain improved with lying still and exacerbated with movement such as getting out of bed. Has not taken meds for pain as of yet or been evaluated by a provider. Patient has had intermittent back pain in the past. denied urinary fecal incontinence, numbness, weakness in extremity, urinary symptoms, rash, trauma. heavy lifting

## 2024-11-05 NOTE — ED ADULT NURSE NOTE - OBJECTIVE STATEMENT
59 year old male pt presented to the ED co left sided back pain, pt is ambulatory A&Ox3 states pain x 2.5 months, denies any trauma to site no heavy lifting, pt states he has not taken anything for pain has not seen any provider prior to today, denies any numbness or tingling no urinary symptoms

## 2025-04-23 NOTE — ED ADULT NURSE NOTE - BREATH SOUNDS, MLM
I have the wheelchair order and last office visit, would you like to write a letter of medical necessity and send this as well? patient may still need to be evaluated at PT as well.  Clear